# Patient Record
Sex: MALE | Race: WHITE | URBAN - METROPOLITAN AREA
[De-identification: names, ages, dates, MRNs, and addresses within clinical notes are randomized per-mention and may not be internally consistent; named-entity substitution may affect disease eponyms.]

---

## 2017-08-12 ENCOUNTER — APPOINTMENT (OUTPATIENT)
Dept: CT IMAGING | Facility: CLINIC | Age: 22
End: 2017-08-12
Attending: EMERGENCY MEDICINE

## 2017-08-12 ENCOUNTER — HOSPITAL ENCOUNTER (OUTPATIENT)
Facility: CLINIC | Age: 22
Setting detail: OBSERVATION
Discharge: HOME | End: 2017-08-12
Attending: EMERGENCY MEDICINE | Admitting: DENTIST

## 2017-08-12 ENCOUNTER — ANESTHESIA EVENT (OUTPATIENT)
Dept: SURGERY | Facility: CLINIC | Age: 22
End: 2017-08-12

## 2017-08-12 ENCOUNTER — SURGERY (OUTPATIENT)
Age: 22
End: 2017-08-12

## 2017-08-12 ENCOUNTER — HOSPITAL ENCOUNTER (EMERGENCY)
Facility: CLINIC | Age: 22
Discharge: ANOTHER HOSPITAL | End: 2017-08-12
Attending: EMERGENCY MEDICINE | Admitting: EMERGENCY MEDICINE

## 2017-08-12 ENCOUNTER — ANESTHESIA (OUTPATIENT)
Dept: SURGERY | Facility: CLINIC | Age: 22
End: 2017-08-12

## 2017-08-12 VITALS
WEIGHT: 170 LBS | OXYGEN SATURATION: 97 % | HEIGHT: 70 IN | HEART RATE: 139 BPM | TEMPERATURE: 98.1 F | BODY MASS INDEX: 24.34 KG/M2 | RESPIRATION RATE: 18 BRPM | SYSTOLIC BLOOD PRESSURE: 131 MMHG | DIASTOLIC BLOOD PRESSURE: 83 MMHG

## 2017-08-12 VITALS
RESPIRATION RATE: 16 BRPM | WEIGHT: 170 LBS | HEIGHT: 71 IN | BODY MASS INDEX: 23.8 KG/M2 | OXYGEN SATURATION: 96 % | HEART RATE: 105 BPM | TEMPERATURE: 98.1 F | SYSTOLIC BLOOD PRESSURE: 129 MMHG | DIASTOLIC BLOOD PRESSURE: 86 MMHG

## 2017-08-12 DIAGNOSIS — S02.609B: ICD-10-CM

## 2017-08-12 DIAGNOSIS — S02.609A: ICD-10-CM

## 2017-08-12 DIAGNOSIS — S02.640A: ICD-10-CM

## 2017-08-12 LAB
ANION GAP SERPL CALCULATED.3IONS-SCNC: 9 MMOL/L (ref 3–14)
BASOPHILS # BLD AUTO: 0 10E9/L (ref 0–0.2)
BASOPHILS NFR BLD AUTO: 0.2 %
BUN SERPL-MCNC: 10 MG/DL (ref 7–30)
CALCIUM SERPL-MCNC: 8.7 MG/DL (ref 8.5–10.1)
CHLORIDE SERPL-SCNC: 110 MMOL/L (ref 94–109)
CO2 SERPL-SCNC: 25 MMOL/L (ref 20–32)
CREAT SERPL-MCNC: 1.05 MG/DL (ref 0.66–1.25)
DIFFERENTIAL METHOD BLD: ABNORMAL
EOSINOPHIL # BLD AUTO: 0 10E9/L (ref 0–0.7)
EOSINOPHIL NFR BLD AUTO: 0 %
ERYTHROCYTE [DISTWIDTH] IN BLOOD BY AUTOMATED COUNT: 12.7 % (ref 10–15)
ETHANOL SERPL-MCNC: 0.22 G/DL
GFR SERPL CREATININE-BSD FRML MDRD: 88 ML/MIN/1.7M2
GLUCOSE SERPL-MCNC: 117 MG/DL (ref 70–99)
HCT VFR BLD AUTO: 48.1 % (ref 40–53)
HGB BLD-MCNC: 16 G/DL (ref 13.3–17.7)
IMM GRANULOCYTES # BLD: 0 10E9/L (ref 0–0.4)
IMM GRANULOCYTES NFR BLD: 0.3 %
LYMPHOCYTES # BLD AUTO: 1.6 10E9/L (ref 0.8–5.3)
LYMPHOCYTES NFR BLD AUTO: 14.2 %
MCH RBC QN AUTO: 28.9 PG (ref 26.5–33)
MCHC RBC AUTO-ENTMCNC: 33.3 G/DL (ref 31.5–36.5)
MCV RBC AUTO: 87 FL (ref 78–100)
MONOCYTES # BLD AUTO: 0.5 10E9/L (ref 0–1.3)
MONOCYTES NFR BLD AUTO: 4.8 %
NEUTROPHILS # BLD AUTO: 8.8 10E9/L (ref 1.6–8.3)
NEUTROPHILS NFR BLD AUTO: 80.5 %
NRBC # BLD AUTO: 0 10*3/UL
NRBC BLD AUTO-RTO: 0 /100
PLATELET # BLD AUTO: 312 10E9/L (ref 150–450)
POTASSIUM SERPL-SCNC: 4.1 MMOL/L (ref 3.4–5.3)
RBC # BLD AUTO: 5.54 10E12/L (ref 4.4–5.9)
SODIUM SERPL-SCNC: 144 MMOL/L (ref 133–144)
WBC # BLD AUTO: 10.9 10E9/L (ref 4–11)

## 2017-08-12 PROCEDURE — 96376 TX/PRO/DX INJ SAME DRUG ADON: CPT | Performed by: EMERGENCY MEDICINE

## 2017-08-12 PROCEDURE — 37000008 ZZH ANESTHESIA TECHNICAL FEE, 1ST 30 MIN: Performed by: DENTIST

## 2017-08-12 PROCEDURE — 27210794 ZZH OR GENERAL SUPPLY STERILE: Performed by: DENTIST

## 2017-08-12 PROCEDURE — 96374 THER/PROPH/DIAG INJ IV PUSH: CPT

## 2017-08-12 PROCEDURE — 25000128 H RX IP 250 OP 636: Performed by: ANESTHESIOLOGY

## 2017-08-12 PROCEDURE — 96376 TX/PRO/DX INJ SAME DRUG ADON: CPT

## 2017-08-12 PROCEDURE — 25000128 H RX IP 250 OP 636: Performed by: EMERGENCY MEDICINE

## 2017-08-12 PROCEDURE — 70450 CT HEAD/BRAIN W/O DYE: CPT

## 2017-08-12 PROCEDURE — 96365 THER/PROPH/DIAG IV INF INIT: CPT | Performed by: EMERGENCY MEDICINE

## 2017-08-12 PROCEDURE — C9399 UNCLASSIFIED DRUGS OR BIOLOG: HCPCS | Performed by: NURSE ANESTHETIST, CERTIFIED REGISTERED

## 2017-08-12 PROCEDURE — 71000027 ZZH RECOVERY PHASE 2 EACH 15 MINS: Performed by: DENTIST

## 2017-08-12 PROCEDURE — 90471 IMMUNIZATION ADMIN: CPT | Performed by: EMERGENCY MEDICINE

## 2017-08-12 PROCEDURE — 99285 EMERGENCY DEPT VISIT HI MDM: CPT | Mod: 25 | Performed by: EMERGENCY MEDICINE

## 2017-08-12 PROCEDURE — 99207 ZZC APP CREDIT; MD BILLING SHARED VISIT: CPT | Mod: Z6 | Performed by: EMERGENCY MEDICINE

## 2017-08-12 PROCEDURE — 36000064 ZZH SURGERY LEVEL 4 EA 15 ADDTL MIN - UMMC: Performed by: DENTIST

## 2017-08-12 PROCEDURE — 25000125 ZZHC RX 250: Performed by: NURSE ANESTHETIST, CERTIFIED REGISTERED

## 2017-08-12 PROCEDURE — 25000125 ZZHC RX 250: Performed by: DENTIST

## 2017-08-12 PROCEDURE — 96361 HYDRATE IV INFUSION ADD-ON: CPT

## 2017-08-12 PROCEDURE — 70486 CT MAXILLOFACIAL W/O DYE: CPT

## 2017-08-12 PROCEDURE — 96375 TX/PRO/DX INJ NEW DRUG ADDON: CPT | Performed by: EMERGENCY MEDICINE

## 2017-08-12 PROCEDURE — 25000132 ZZH RX MED GY IP 250 OP 250 PS 637: Performed by: STUDENT IN AN ORGANIZED HEALTH CARE EDUCATION/TRAINING PROGRAM

## 2017-08-12 PROCEDURE — 90715 TDAP VACCINE 7 YRS/> IM: CPT | Performed by: EMERGENCY MEDICINE

## 2017-08-12 PROCEDURE — 71000015 ZZH RECOVERY PHASE 1 LEVEL 2 EA ADDTL HR: Performed by: DENTIST

## 2017-08-12 PROCEDURE — 37000009 ZZH ANESTHESIA TECHNICAL FEE, EACH ADDTL 15 MIN: Performed by: DENTIST

## 2017-08-12 PROCEDURE — 71000014 ZZH RECOVERY PHASE 1 LEVEL 2 FIRST HR: Performed by: DENTIST

## 2017-08-12 PROCEDURE — G0378 HOSPITAL OBSERVATION PER HR: HCPCS

## 2017-08-12 PROCEDURE — 25000566 ZZH SEVOFLURANE, EA 15 MIN: Performed by: DENTIST

## 2017-08-12 PROCEDURE — 99285 EMERGENCY DEPT VISIT HI MDM: CPT | Mod: 25

## 2017-08-12 PROCEDURE — 80048 BASIC METABOLIC PNL TOTAL CA: CPT | Performed by: EMERGENCY MEDICINE

## 2017-08-12 PROCEDURE — 25000132 ZZH RX MED GY IP 250 OP 250 PS 637: Performed by: EMERGENCY MEDICINE

## 2017-08-12 PROCEDURE — 25000128 H RX IP 250 OP 636: Performed by: STUDENT IN AN ORGANIZED HEALTH CARE EDUCATION/TRAINING PROGRAM

## 2017-08-12 PROCEDURE — C1713 ANCHOR/SCREW BN/BN,TIS/BN: HCPCS | Performed by: DENTIST

## 2017-08-12 PROCEDURE — 25000128 H RX IP 250 OP 636: Performed by: NURSE ANESTHETIST, CERTIFIED REGISTERED

## 2017-08-12 PROCEDURE — 25000132 ZZH RX MED GY IP 250 OP 250 PS 637: Performed by: ANESTHESIOLOGY

## 2017-08-12 PROCEDURE — 36000062 ZZH SURGERY LEVEL 4 1ST 30 MIN - UMMC: Performed by: DENTIST

## 2017-08-12 PROCEDURE — 40000170 ZZH STATISTIC PRE-PROCEDURE ASSESSMENT II: Performed by: DENTIST

## 2017-08-12 PROCEDURE — 72125 CT NECK SPINE W/O DYE: CPT

## 2017-08-12 PROCEDURE — 80320 DRUG SCREEN QUANTALCOHOLS: CPT | Performed by: EMERGENCY MEDICINE

## 2017-08-12 PROCEDURE — 85025 COMPLETE CBC W/AUTO DIFF WBC: CPT | Performed by: EMERGENCY MEDICINE

## 2017-08-12 DEVICE — IMP SCR SYN 2.0X12MM LOCKING W/PULSEDRIVE TI 401.296: Type: IMPLANTABLE DEVICE | Site: MANDIBLE | Status: FUNCTIONAL

## 2017-08-12 DEVICE — IMPLANTABLE DEVICE: Type: IMPLANTABLE DEVICE | Site: MANDIBLE | Status: FUNCTIONAL

## 2017-08-12 DEVICE — IMP PLATE SYN LOCKING LG STR 2.0MM 06H TI 447.104: Type: IMPLANTABLE DEVICE | Site: MANDIBLE | Status: FUNCTIONAL

## 2017-08-12 RX ORDER — HYDROCODONE BITARTRATE AND ACETAMINOPHEN 5; 325 MG/1; MG/1
1-2 TABLET ORAL EVERY 4 HOURS PRN
Status: DISCONTINUED | OUTPATIENT
Start: 2017-08-12 | End: 2017-08-12 | Stop reason: HOSPADM

## 2017-08-12 RX ORDER — LIDOCAINE 40 MG/G
CREAM TOPICAL
Status: DISCONTINUED | OUTPATIENT
Start: 2017-08-12 | End: 2017-08-12 | Stop reason: HOSPADM

## 2017-08-12 RX ORDER — MORPHINE SULFATE 4 MG/ML
4 INJECTION, SOLUTION INTRAMUSCULAR; INTRAVENOUS ONCE
Status: COMPLETED | OUTPATIENT
Start: 2017-08-12 | End: 2017-08-12

## 2017-08-12 RX ORDER — AMPICILLIN AND SULBACTAM 2; 1 G/1; G/1
3 INJECTION, POWDER, FOR SOLUTION INTRAMUSCULAR; INTRAVENOUS ONCE
Status: COMPLETED | OUTPATIENT
Start: 2017-08-12 | End: 2017-08-12

## 2017-08-12 RX ORDER — BUPIVACAINE HYDROCHLORIDE AND EPINEPHRINE 5; 5 MG/ML; UG/ML
INJECTION, SOLUTION PERINEURAL PRN
Status: DISCONTINUED | OUTPATIENT
Start: 2017-08-12 | End: 2017-08-12 | Stop reason: HOSPADM

## 2017-08-12 RX ORDER — IBUPROFEN 200 MG
600 TABLET ORAL EVERY 6 HOURS PRN
Qty: 50 TABLET | Refills: 0 | Status: SHIPPED | OUTPATIENT
Start: 2017-08-12 | End: 2017-08-22

## 2017-08-12 RX ORDER — SODIUM CHLORIDE 9 MG/ML
INJECTION, SOLUTION INTRAVENOUS CONTINUOUS
Status: DISCONTINUED | OUTPATIENT
Start: 2017-08-12 | End: 2017-08-12 | Stop reason: HOSPADM

## 2017-08-12 RX ORDER — LIDOCAINE HYDROCHLORIDE 20 MG/ML
INJECTION, SOLUTION INFILTRATION; PERINEURAL PRN
Status: DISCONTINUED | OUTPATIENT
Start: 2017-08-12 | End: 2017-08-12

## 2017-08-12 RX ORDER — HYDROCODONE BITARTRATE AND ACETAMINOPHEN 5; 325 MG/1; MG/1
1 TABLET ORAL
Status: COMPLETED | OUTPATIENT
Start: 2017-08-12 | End: 2017-08-12

## 2017-08-12 RX ORDER — ONDANSETRON 2 MG/ML
4 INJECTION INTRAMUSCULAR; INTRAVENOUS EVERY 30 MIN PRN
Status: DISCONTINUED | OUTPATIENT
Start: 2017-08-12 | End: 2017-08-12 | Stop reason: HOSPADM

## 2017-08-12 RX ORDER — AMOXICILLIN 500 MG/1
500 CAPSULE ORAL 3 TIMES DAILY
Qty: 21 CAPSULE | Refills: 0 | Status: SHIPPED | OUTPATIENT
Start: 2017-08-12 | End: 2017-08-19

## 2017-08-12 RX ORDER — HYDROMORPHONE HYDROCHLORIDE 1 MG/ML
.3-.5 INJECTION, SOLUTION INTRAMUSCULAR; INTRAVENOUS; SUBCUTANEOUS EVERY 5 MIN PRN
Status: DISCONTINUED | OUTPATIENT
Start: 2017-08-12 | End: 2017-08-12 | Stop reason: HOSPADM

## 2017-08-12 RX ORDER — MORPHINE SULFATE 2 MG/ML
2 INJECTION, SOLUTION INTRAMUSCULAR; INTRAVENOUS EVERY 4 HOURS PRN
Status: DISCONTINUED | OUTPATIENT
Start: 2017-08-12 | End: 2017-08-12 | Stop reason: HOSPADM

## 2017-08-12 RX ORDER — FENTANYL CITRATE 50 UG/ML
25-50 INJECTION, SOLUTION INTRAMUSCULAR; INTRAVENOUS
Status: DISCONTINUED | OUTPATIENT
Start: 2017-08-12 | End: 2017-08-12 | Stop reason: HOSPADM

## 2017-08-12 RX ORDER — HYDROCODONE BITARTRATE AND ACETAMINOPHEN 5; 325 MG/1; MG/1
1-2 TABLET ORAL EVERY 4 HOURS PRN
Qty: 20 TABLET | Refills: 0 | Status: SHIPPED | OUTPATIENT
Start: 2017-08-12 | End: 2017-08-19

## 2017-08-12 RX ORDER — ONDANSETRON 2 MG/ML
INJECTION INTRAMUSCULAR; INTRAVENOUS PRN
Status: DISCONTINUED | OUTPATIENT
Start: 2017-08-12 | End: 2017-08-12

## 2017-08-12 RX ORDER — ONDANSETRON 4 MG/1
4 TABLET, ORALLY DISINTEGRATING ORAL EVERY 6 HOURS PRN
Status: DISCONTINUED | OUTPATIENT
Start: 2017-08-12 | End: 2017-08-12 | Stop reason: HOSPADM

## 2017-08-12 RX ORDER — NALOXONE HYDROCHLORIDE 0.4 MG/ML
.1-.4 INJECTION, SOLUTION INTRAMUSCULAR; INTRAVENOUS; SUBCUTANEOUS
Status: DISCONTINUED | OUTPATIENT
Start: 2017-08-12 | End: 2017-08-12 | Stop reason: HOSPADM

## 2017-08-12 RX ORDER — ACETAMINOPHEN 325 MG/1
650 TABLET ORAL EVERY 4 HOURS PRN
Qty: 50 TABLET | Refills: 0 | Status: SHIPPED | OUTPATIENT
Start: 2017-08-12 | End: 2017-08-22

## 2017-08-12 RX ORDER — CHLORHEXIDINE GLUCONATE ORAL RINSE 1.2 MG/ML
SOLUTION DENTAL
Qty: 473 ML | Refills: 0 | Status: SHIPPED | OUTPATIENT
Start: 2017-08-12

## 2017-08-12 RX ORDER — FENTANYL CITRATE 50 UG/ML
INJECTION, SOLUTION INTRAMUSCULAR; INTRAVENOUS PRN
Status: DISCONTINUED | OUTPATIENT
Start: 2017-08-12 | End: 2017-08-12

## 2017-08-12 RX ORDER — IBUPROFEN 600 MG/1
600 TABLET, FILM COATED ORAL EVERY 6 HOURS PRN
Status: DISCONTINUED | OUTPATIENT
Start: 2017-08-12 | End: 2017-08-12 | Stop reason: HOSPADM

## 2017-08-12 RX ORDER — PROPOFOL 10 MG/ML
INJECTION, EMULSION INTRAVENOUS PRN
Status: DISCONTINUED | OUTPATIENT
Start: 2017-08-12 | End: 2017-08-12

## 2017-08-12 RX ORDER — ONDANSETRON 2 MG/ML
4 INJECTION INTRAMUSCULAR; INTRAVENOUS EVERY 6 HOURS PRN
Status: DISCONTINUED | OUTPATIENT
Start: 2017-08-12 | End: 2017-08-12 | Stop reason: HOSPADM

## 2017-08-12 RX ORDER — AMPICILLIN AND SULBACTAM 1; .5 G/1; G/1
INJECTION, POWDER, FOR SOLUTION INTRAMUSCULAR; INTRAVENOUS PRN
Status: DISCONTINUED | OUTPATIENT
Start: 2017-08-12 | End: 2017-08-12

## 2017-08-12 RX ORDER — ONDANSETRON 4 MG/1
4 TABLET, ORALLY DISINTEGRATING ORAL EVERY 30 MIN PRN
Status: DISCONTINUED | OUTPATIENT
Start: 2017-08-12 | End: 2017-08-12 | Stop reason: HOSPADM

## 2017-08-12 RX ORDER — SODIUM CHLORIDE, SODIUM LACTATE, POTASSIUM CHLORIDE, CALCIUM CHLORIDE 600; 310; 30; 20 MG/100ML; MG/100ML; MG/100ML; MG/100ML
INJECTION, SOLUTION INTRAVENOUS CONTINUOUS
Status: DISCONTINUED | OUTPATIENT
Start: 2017-08-12 | End: 2017-08-12 | Stop reason: HOSPADM

## 2017-08-12 RX ORDER — KETOROLAC TROMETHAMINE 30 MG/ML
INJECTION, SOLUTION INTRAMUSCULAR; INTRAVENOUS PRN
Status: DISCONTINUED | OUTPATIENT
Start: 2017-08-12 | End: 2017-08-12

## 2017-08-12 RX ORDER — DEXAMETHASONE SODIUM PHOSPHATE 4 MG/ML
INJECTION, SOLUTION INTRA-ARTICULAR; INTRALESIONAL; INTRAMUSCULAR; INTRAVENOUS; SOFT TISSUE PRN
Status: DISCONTINUED | OUTPATIENT
Start: 2017-08-12 | End: 2017-08-12

## 2017-08-12 RX ORDER — NICOTINE 21 MG/24HR
1 PATCH, TRANSDERMAL 24 HOURS TRANSDERMAL DAILY
Status: DISCONTINUED | OUTPATIENT
Start: 2017-08-12 | End: 2017-08-12 | Stop reason: HOSPADM

## 2017-08-12 RX ADMIN — KETOROLAC TROMETHAMINE 30 MG: 30 INJECTION, SOLUTION INTRAMUSCULAR at 18:18

## 2017-08-12 RX ADMIN — SODIUM CHLORIDE 1000 ML: 9 INJECTION, SOLUTION INTRAVENOUS at 13:20

## 2017-08-12 RX ADMIN — MORPHINE SULFATE 4 MG: 4 INJECTION, SOLUTION INTRAMUSCULAR; INTRAVENOUS at 13:20

## 2017-08-12 RX ADMIN — SODIUM CHLORIDE: 9 INJECTION, SOLUTION INTRAVENOUS at 16:15

## 2017-08-12 RX ADMIN — SODIUM CHLORIDE: 9 INJECTION, SOLUTION INTRAVENOUS at 17:53

## 2017-08-12 RX ADMIN — DEXAMETHASONE SODIUM PHOSPHATE 8 MG: 4 INJECTION, SOLUTION INTRA-ARTICULAR; INTRALESIONAL; INTRAMUSCULAR; INTRAVENOUS; SOFT TISSUE at 16:30

## 2017-08-12 RX ADMIN — MORPHINE SULFATE 4 MG: 4 INJECTION, SOLUTION INTRAMUSCULAR; INTRAVENOUS at 09:31

## 2017-08-12 RX ADMIN — HYDROCODONE BITARTRATE AND ACETAMINOPHEN 1 TABLET: 5; 325 TABLET ORAL at 20:35

## 2017-08-12 RX ADMIN — NICOTINE 1 PATCH: 14 PATCH, EXTENDED RELEASE TRANSDERMAL at 13:56

## 2017-08-12 RX ADMIN — FENTANYL CITRATE 25 MCG: 50 INJECTION, SOLUTION INTRAMUSCULAR; INTRAVENOUS at 19:33

## 2017-08-12 RX ADMIN — FENTANYL CITRATE 50 MCG: 50 INJECTION, SOLUTION INTRAMUSCULAR; INTRAVENOUS at 18:28

## 2017-08-12 RX ADMIN — SUGAMMADEX 160 MG: 100 INJECTION, SOLUTION INTRAVENOUS at 18:37

## 2017-08-12 RX ADMIN — ONDANSETRON 4 MG: 2 INJECTION INTRAMUSCULAR; INTRAVENOUS at 18:21

## 2017-08-12 RX ADMIN — MORPHINE SULFATE 4 MG: 4 INJECTION, SOLUTION INTRAMUSCULAR; INTRAVENOUS at 05:58

## 2017-08-12 RX ADMIN — FENTANYL CITRATE 100 MCG: 50 INJECTION, SOLUTION INTRAMUSCULAR; INTRAVENOUS at 16:15

## 2017-08-12 RX ADMIN — ROCURONIUM BROMIDE 50 MG: 10 INJECTION INTRAVENOUS at 16:25

## 2017-08-12 RX ADMIN — MORPHINE SULFATE 4 MG: 4 INJECTION, SOLUTION INTRAMUSCULAR; INTRAVENOUS at 05:23

## 2017-08-12 RX ADMIN — MORPHINE SULFATE 4 MG: 4 INJECTION, SOLUTION INTRAMUSCULAR; INTRAVENOUS at 10:46

## 2017-08-12 RX ADMIN — AMPICILLIN SODIUM AND SULBACTAM SODIUM 3 G: 1; .5 INJECTION, POWDER, FOR SOLUTION INTRAMUSCULAR; INTRAVENOUS at 16:35

## 2017-08-12 RX ADMIN — HYDROCODONE BITARTRATE AND ACETAMINOPHEN 2 TABLET: 5; 325 TABLET ORAL at 13:59

## 2017-08-12 RX ADMIN — BUPIVACAINE HYDROCHLORIDE AND EPINEPHRINE BITARTRATE 10 ML: 5; .005 INJECTION, SOLUTION EPIDURAL; INTRACAUDAL; PERINEURAL at 17:04

## 2017-08-12 RX ADMIN — CLOSTRIDIUM TETANI TOXOID ANTIGEN (FORMALDEHYDE INACTIVATED), CORYNEBACTERIUM DIPHTHERIAE TOXOID ANTIGEN (FORMALDEHYDE INACTIVATED), BORDETELLA PERTUSSIS TOXOID ANTIGEN (GLUTARALDEHYDE INACTIVATED), BORDETELLA PERTUSSIS FILAMENTOUS HEMAGGLUTININ ANTIGEN (FORMALDEHYDE INACTIVATED), BORDETELLA PERTUSSIS PERTACTIN ANTIGEN, AND BORDETELLA PERTUSSIS FIMBRIAE 2/3 ANTIGEN 0.5 ML: 5; 2; 2.5; 5; 3; 5 INJECTION, SUSPENSION INTRAMUSCULAR at 09:32

## 2017-08-12 RX ADMIN — MORPHINE SULFATE 4 MG: 4 INJECTION, SOLUTION INTRAMUSCULAR; INTRAVENOUS at 08:29

## 2017-08-12 RX ADMIN — FENTANYL CITRATE 50 MCG: 50 INJECTION, SOLUTION INTRAMUSCULAR; INTRAVENOUS at 16:25

## 2017-08-12 RX ADMIN — PROPOFOL 200 MG: 10 INJECTION, EMULSION INTRAVENOUS at 16:25

## 2017-08-12 RX ADMIN — SODIUM CHLORIDE: 9 INJECTION, SOLUTION INTRAVENOUS at 17:15

## 2017-08-12 RX ADMIN — MORPHINE SULFATE 4 MG: 4 INJECTION, SOLUTION INTRAMUSCULAR; INTRAVENOUS at 06:40

## 2017-08-12 RX ADMIN — MORPHINE SULFATE 4 MG: 4 INJECTION, SOLUTION INTRAMUSCULAR; INTRAVENOUS at 04:44

## 2017-08-12 RX ADMIN — FENTANYL CITRATE 25 MCG: 50 INJECTION, SOLUTION INTRAMUSCULAR; INTRAVENOUS at 20:00

## 2017-08-12 RX ADMIN — MIDAZOLAM HYDROCHLORIDE 2 MG: 1 INJECTION, SOLUTION INTRAMUSCULAR; INTRAVENOUS at 16:15

## 2017-08-12 RX ADMIN — SODIUM CHLORIDE 1000 ML: 9 INJECTION, SOLUTION INTRAVENOUS at 05:23

## 2017-08-12 RX ADMIN — AMPICILLIN SODIUM AND SULBACTAM SODIUM 3 G: 2; 1 INJECTION, POWDER, FOR SOLUTION INTRAMUSCULAR; INTRAVENOUS at 09:34

## 2017-08-12 RX ADMIN — LIDOCAINE HYDROCHLORIDE 100 MG: 20 INJECTION, SOLUTION INFILTRATION; PERINEURAL at 16:25

## 2017-08-12 ASSESSMENT — PAIN DESCRIPTION - DESCRIPTORS
DESCRIPTORS: SORE

## 2017-08-12 ASSESSMENT — ENCOUNTER SYMPTOMS
DYSRHYTHMIAS: 1
FEVER: 0
VOMITING: 0
SHORTNESS OF BREATH: 0
NECK PAIN: 0
WOUND: 1
NAUSEA: 0

## 2017-08-12 NOTE — ED NOTES
Physician in to discuss transfer with pt. Pt verbalizes understanding of plan to transfer by private vehicle to [facility] ER

## 2017-08-12 NOTE — ED NOTES
Pt and significant other verbalize understanding of need to go directly to [facility] ER, NPO status discussed and pt verbalizes understanding. Directions provided to Pt's significant other

## 2017-08-12 NOTE — CONSULTS
[facility], [place]     Consult note: Trauma Service     Date of Admission:  8/12/2017    Time of Admission/Consult Request (page/call): 8/12/17  Time of my evaluation: 1pm     Assessment & Plan   Trauma mechanism: fall   Time/date of injury: 8/12/17  Known Injuries:  1. Fracture of the left mandibular mentum and bilateral mandibular rami.    Other diagnoses:   1. Acute pain   2. Pain  3. BAL 0.22 ( at time of presentation to OSH)     Procedure: pending     Plan:  1. Trauma consult--no additional sources of pain.no additional injuries noted.   2. Tertiary examination to follow in am (if pt still inpatient). Trauma to follow along. Call job code [id number] if we can be of assistance.   3. Per Discussion with ED staff--OMFS primary and will admit.  Spoke with resident on call, who confirms this.   4. OR pending OMFS, current time pending for 4pm per discussion with OMFS resident on call.   5. Pain control per discretion of OMFS team.   6. Ice and HOB elevation.   General Cares:  GI Prophylaxis:  None   DVT Prophylaxis:  Hold pending OR     ETOH: This patient was asked if in the last 3-6 months there has been a time when he had  5 or more drinks in a single day/outing.. Patient answer to the screening question was negative. He reports he only drinks 4 drinks.  He report he and his brother got into agiht with alcohol. I did speak with the patient about the correlation of ETOH use and accidents/injuries. We also discussed the importance of abstaining from ETOH use while healing from existing injuries, especially if prescribed narcotics at the time of discharge. The patient demonstrated understanding.  Primary Care Physician   *Physician No Ref-Primary    Chief Complaint   Fall with mandible fracture     History is obtained from the patient    History of Present Illness   Freddy Lange is a 22 year old male who presents with bilateral subcondylar and left parasymphysis fracture. 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 He reports while walking down 3 steps, he accidentally misstep and hit his heel causing him to fall forward onto his jaw. He reports landing on he jaw. He denies any other injuries or losing consciousness.      He currently reports pain in in his face, on his left jaw more than his right. He denies chest pain, shortness of breath, fever or chills or other sources of pain.     Past Medical History    He denies any significant medical history, he does report what appears to be PVC during high school wrestling physical when he was 16 but has not had any issues since that time.     Past Surgical History   He denies any surgical history.     Prior to Admission Medications   Prior to Admission Medications   Prescriptions Last Dose Informant Patient Reported? Taking?   NO ACTIVE MEDICATIONS   Yes No   ibuprofen (ADVIL,MOTRIN) 800 MG tablet Unknown at Unknown time  No No   Sig: Take 1 tablet (800 mg) by mouth every 8 hours as needed for moderate pain      Facility-Administered Medications: None     Allergies   No Known Allergies    Social History   Social History     Social History     Marital status: Single     Spouse name: N/A     Number of children: N/A     Years of education: N/A     Social History Narrative     He is single but does have a girlfriend. He chews tobacco daily. He denies smoking tobacco. He reports social use of alcohol. He denies any IV, street or recreational drug use.     Family History   I have reviewed this patient's family history and updated it with pertinent information if needed.   Family History   Problem Relation Age of Onset     DIABETES Father  now       DIABETES Mother      Unknown/Adopted Maternal Grandfather      Cancer - colorectal Paternal Grandmother      Unknown/Adopted Paternal Grandfather      Cardiovascular--coarctation of Aorta at 14 yos Brother        Review of Systems   CONSTITUTIONAL: No fever, chills, sweats,  fatigue   EYES: no visual blurring, no double vision or visual loss  ENT: (+) Jaw pain and swelling   RESPIRATORY: no shortness of breath, no cough, no sputum   CARDIOVASCULAR: no palpitations, no chest  pain, no exertional chest pain or pressure  GASTROINTESTINAL: no nausea or vomiting, or abd pain  GENITOURINARY: no dysuria, no frequency or hesitancy, no hematuria  MUSCULOSKELETAL: no weakness, no redness, no swelling, no joint pain,   SKIN: no rashes, ecchymoses, abrasions or lacerations  NEUROLOGIC: no numbness or tingling of hands, no numbness or tingling  of feet, no syncope, no tremors or weakness  PSYCHIATRIC: no sleep disturbances, no anxiety or depression    Physical Exam   Temp: 98.7  F (37.1  C) Temp src: Oral BP: 126/77 Pulse: 118   Resp: 15 SpO2: 99 % O2 Device: None (Room air)    Vital Signs with Ranges  Temp:  [98.1  F (36.7  C)-98.7  F (37.1  C)] 98.7  F (37.1  C)  Pulse:  [118-139] 118  Resp:  [] 15  BP: (106-144)/() 126/77  SpO2:  [96 %-100 %] 99 % 170 lbs 0 oz    Primary Survey:  Airway: patient talking  Breathing: symmetric respiratory effort bilaterally  Circulation: central pulses present and peripheral pulses present  Disability: Pupils - left 4 mm and brisk, right 4 mm and brisk     [place] Coma Scale - Total 15/15    Secondary Survey:  General: alert, oriented to person, place, time  Head: No scalp tenderness or swelling. Midface without TTP,  Jaw pain L>R. Left facial swelling more prominent.   Eyes: PERRLA, pupils 4mm, EOMI, corneas and conjunctivae clear  Ears: TM's occluded by cerumen, and non-inflamed external ear canals, no pain in auricle or pinnae  Nose: nares patent, no drainage, nasal septum non-tender  Mouth/Throat: OP with some dried blood. Front tooth chipped (Left)--chronic per patient tongue midline, (+) malocclusion.   Neck:  No cervical collar present. No midline posterior tenderness, full AROM without pain or tenderness   Chest/Pulmonary: normal respiratory  rate and rhythm,  bilateral clear breath sounds, no wheezes, rales or rhonchi, no chest wall tenderness or deformities,   Cardiovascular: S1, S2,  normal and regular rate and rhythm, no murmurs  Abdomen: soft, non-tender, no guarding, no rebound tenderness and no tenderness to palpation  : no berman, no urine assess/urine yellow and clear  Back/Spine: no deformity, no midline tenderness, no sacral tenderness,  no step-offs and no abrasions or contusions  Musculoskel/Extremities: normal extremities, full AROM of major joints without tenderness, edema, erythema, ecchymosis, or abrasions. 2+PP, no edema.  No posterior spinal tenderness   Hand: no gross deformities of hands or fingers. Full AROM of hand and fingers in flexion and extension.  strength equal and symmetric.   Skin: no rashes, laceration, ecchymosis, skin warm and dry.   Neuro: PERRLA, alert, oriented x 4. CN II-XII grossly intact. No focal deficits. Strength 5/5 x 4 extremities.  Sensation intact.  Psychiatric: affect/mood normal, cooperative, normal judgement/insight and memory intact    Data   basic metabolic panel  Last Basic Metabolic Panel:  Lab Results   Component Value Date     08/12/2017      Lab Results   Component Value Date    POTASSIUM 4.1 08/12/2017     Lab Results   Component Value Date    CHLORIDE 110 08/12/2017     Lab Results   Component Value Date    NELLY 8.7 08/12/2017     Lab Results   Component Value Date    CO2 25 08/12/2017     Lab Results   Component Value Date    BUN 10 08/12/2017     Lab Results   Component Value Date    CR 1.05 08/12/2017     Lab Results   Component Value Date     08/12/2017       CBC RESULTS:   Recent Labs   Lab Test  08/12/17   0445   WBC  10.9   RBC  5.54   HGB  16.0   HCT  48.1   MCV  87   MCH  28.9   MCHC  33.3   RDW  12.7   PLT  312      Blood alcohol level: 0.22    Deep Mcleod PA-C

## 2017-08-12 NOTE — ED NOTES
Pt to ER with c/o facial trauma , left lower jaw deformity, pt face planted on concrete, pt denies any neck or head pain, no LOC, pt does have a chipped front tooth but that is old, pt has obvious displacement of lower mandible

## 2017-08-12 NOTE — IP AVS SNAPSHOT
MRN:[id number]                      After Visit Summary   8/12/2017    Freddy Lange    MRN: [id number]           Thank you!     Thank you for choosing [facility] for your care. Our goal is always to provide you with excellent care. Hearing back from our patients is one way we can continue to improve our services. Please take a few minutes to complete the written survey that you may receive in the mail after you visit with us. Thank you!        Patient Information     Date Of Birth          1995        About your hospital stay     You were admitted on:  August 12, 2017 You last received care in the:  Post Anesthesia Care Unit [facility]    You were discharged on:  August 12, 2017       Who to Call     For medical emergencies, please call 911.  For non-urgent questions about your medical care, please call your primary care provider or clinic, None  For questions related to your surgery, please call your surgery clinic        Attending Provider     Provider Specialty    Carmita Gracia MD Emergency Medicine    Juventino Gonzalez DDS Oral Surgery       Primary Care Provider    Physician No Ref-Primary      After Care Instructions     Discharge Instructions       - No lifting greater than 15 lbs, no driving while using narcotic pain medication, no strenuous exercise for 2 weeks.   - Patient to rest quietly day after surgery  - Patient can resume light active duty on post-operative day #2.  - Patient to maintain good oral hygiene with brushing of teeth 2x/day with soft toothbrush starting day after surgery.  - Patient to maintain full liquid to soft diet. Examples: pudding, jello, yogurt, ice cream, milkshakes, Boost, Ensure, mashed potatoes, soup, apple sauce, etc. until further specified by OMS at follow-up.  - Please avoid smoking, spitting, drinking through straws or vigoroursly rinsing your mouth.   - After 24 hours, begin gentle salt water rinses, several times a day, especially after  eating.   - Some bleeding is normal from surgical areas. We recommend firm pressure, usually by biting on gauze. If bleeding occurs place a moist gauze on the surgical site until active bleeding stops. It is important that the gauze is in proper position and pressure is kept on the area for 20-30 minutes to control bleeding.   - Some swelling and bruising may occur following surgery. This will usually peak in 36-48 hours. We recommend ice pack to area on outside of face. It should stay on 10 minutes then off for a short while so the skin doesn't get cold.   - While sleeping or resting, elevate your head on 2 pillows, it will help with swelling.   - Please call [phone] to ask for oral surgery resident on call if questions or concerns.   - Follow-up appt: [facility] 7th Floor, Tracey Zeeland ([address] [facility]) - on Thursday 8/17/17 - please call to schedule time [phone] / [phone].                  Further instructions from your care team       [facility]  Same-Day Surgery   Adult Discharge Orders & Instructions     For 24 hours after surgery    1. Get plenty of rest.  A responsible adult must stay with you for at least 24 hours after you leave the hospital.   2. Do not drive or use heavy equipment.  If you have weakness or tingling, don't drive or use heavy equipment until this feeling goes away.  3. Do not drink alcohol.  4. Avoid strenuous or risky activities.  Ask for help when climbing stairs.   5. You may feel lightheaded.  IF so, sit for a few minutes before standing.  Have someone help you get up.   6. If you have nausea (feel sick to your stomach): Drink only clear liquids such as apple juice, ginger ale, broth or 7-Up.  Rest may also help.  Be sure to drink enough fluids.  Move to a regular diet as you feel able.  7. You may have a slight fever. Call the doctor if your fever is over 100 F (37.7 C) (taken under the tongue) or lasts longer than 24  "hours.  8. You may have a dry mouth, a sore throat, muscle aches or trouble sleeping.  These should go away after 24 hours.  9. Do not make important or legal decisions.   Call your doctor for any of the followin.  Signs of infection (fever, growing tenderness at the surgery site, a large amount of drainage or bleeding, severe pain, foul-smelling drainage, redness, swelling).    2. It has been over 8 to 10 hours since surgery and you are still not able to urinate (pass water).    3.  Headache for over 24 hours.      To contact a doctor, call Dr. Gonzalez office @ [phone]   or:  X   [phone] and ask for the resident on call for Oral Surgery  (answered 24 hours a day)  X   Emergency Department:  [facility] [place] [facility]: [phone]       (TTY for hearing impaired: [phone])            Pending Results     No orders found from 8/10/2017 to 2017.            Admission Information     Date & Time Provider Department Dept. Phone    2017 Juventino Gonzalez DDS Post Anesthesia Care Unit [facility] [phone]      Your Vitals Were     Blood Pressure Pulse Temperature Respirations Height Weight    142/81 105 98.2  F (36.8  C) (Axillary) 14 1.803 m (5' 11\") 77.1 kg (170 lb)    Pulse Oximetry BMI (Body Mass Index)                100% 23.71 kg/m2          [employer] Information     [employer] lets you send messages to your doctor, view your test results, renew your prescriptions, schedule appointments and more. To sign up, go to [email or web address] . Click on \"Log in\" on the left side of the screen, which will take you to the Welcome page. Then click on \"Sign up Now\" on the right side of the page.     You will be asked to enter the access code listed below, as well as some personal information. Please follow the directions to create your username and password.     Your access code is: [email or web address]  Expires: 11/10/2017  7:05 PM     Your access code will  in 90 days. If you need help or a new code, " please call your [place] clinic or [phone].        Care EveryWhere ID     This is your Care EveryWhere ID. This could be used by other organizations to access your [place] medical records  [id number]        Equal Access to Services     GALI MANNING : Hadii aad ku hadchanoshaniqua Guanaco, waaxda luqadaha, qaybta kaalmada aderolyyada, rita branhamscotty cam michaelkilo olmedo. So [facility] [phone].    ATENCIÓN: Si habla español, tiene a deluca disposición servicios gratuitos de asistencia lingüística. Llame al [phone].    We comply with applicable federal civil rights laws and [place] laws. We do not discriminate on the basis of race, color, national origin, age, disability sex, sexual orientation or gender identity.               Review of your medicines      START taking        Dose / Directions    acetaminophen 325 MG tablet   Commonly known as:  TYLENOL   Used for:  Bilateral fracture of mandible, open, initial encounter (H)        Dose:  650 mg   Take 2 tablets (650 mg) by mouth every 4 hours as needed for pain or fever (mild pain or fever)   Quantity:  50 tablet   Refills:  0       amoxicillin 500 MG capsule   Commonly known as:  AMOXIL   Used for:  Bilateral fracture of mandible, open, initial encounter (H)        Dose:  500 mg   Take 1 capsule (500 mg) by mouth 3 times daily for 7 days   Quantity:  21 capsule   Refills:  0       chlorhexidine 0.12 % solution   Commonly known as:  PERIDEX   Used for:  Bilateral fracture of mandible, open, initial encounter (H)        Swish 10 mL in mouth for 30 seconds and spit two times daily   Quantity:  473 mL   Refills:  0       HYDROcodone-acetaminophen 5-325 MG per tablet   Commonly known as:  NORCO   Used for:  Bilateral fracture of mandible, open, initial encounter (H)        Dose:  1-2 tablet   Take 1-2 tablets by mouth every 4 hours as needed for pain (moderate to severe pain)   Quantity:  20 tablet   Refills:  0       nicotine 7 MG/24HR 24 hr patch   Commonly  known as:  NICODERM CQ   Used for:  Bilateral fracture of mandible, open, initial encounter (H)        Dose:  1 patch   Place 1 patch onto the skin every 24 hours   Quantity:  30 patch   Refills:  0         CONTINUE these medicines which may have CHANGED, or have new prescriptions. If we are uncertain of the size of tablets/capsules you have at home, strength may be listed as something that might have changed.        Dose / Directions    ibuprofen 200 MG tablet   Commonly known as:  ADVIL/MOTRIN   This may have changed:    - medication strength  - how much to take  - reasons to take this   Used for:  Bilateral fracture of mandible, open, initial encounter (H)        Dose:  600 mg   Take 3 tablets (600 mg) by mouth every 6 hours as needed for pain or fever (mild to moderate pain, or temperature greater than 102 F)   Quantity:  50 tablet   Refills:  0            Where to get your medicines      These medications were sent to [facility] [address] [place] [address] [address]  [address] [facility] [address]     Phone:  [phone]     acetaminophen 325 MG tablet    amoxicillin 500 MG capsule    chlorhexidine 0.12 % solution    ibuprofen 200 MG tablet         Some of these will need a paper prescription and others can be bought over the counter. Ask your nurse if you have questions.     Bring a paper prescription for each of these medications     HYDROcodone-acetaminophen 5-325 MG per tablet    nicotine 7 MG/24HR 24 hr patch                Protect others around you: Learn how to safely use, store and throw away your medicines at www.disposemymeds.org.             Medication List: This is a list of all your medications and when to take them. Check marks below indicate your daily home schedule. Keep this list as a reference.      Medications           Morning Afternoon Evening Bedtime As Needed    acetaminophen 325 MG tablet   Commonly known as:  TYLENOL    Take 2 tablets (650 mg) by mouth every 4 hours as needed for pain or fever (mild pain or fever)                                amoxicillin 500 MG capsule   Commonly known as:  AMOXIL   Take 1 capsule (500 mg) by mouth 3 times daily for 7 days                                chlorhexidine 0.12 % solution   Commonly known as:  PERIDEX   Swish 10 mL in mouth for 30 seconds and spit two times daily                                HYDROcodone-acetaminophen 5-325 MG per tablet   Commonly known as:  NORCO   Take 1-2 tablets by mouth every 4 hours as needed for pain (moderate to severe pain)   Last time this was given:  2 tablets on 8/12/2017  1:59 PM                                ibuprofen 200 MG tablet   Commonly known as:  ADVIL/MOTRIN   Take 3 tablets (600 mg) by mouth every 6 hours as needed for pain or fever (mild to moderate pain, or temperature greater than 102 F)                                nicotine 7 MG/24HR 24 hr patch   Commonly known as:  NICODERM CQ   Place 1 patch onto the skin every 24 hours

## 2017-08-12 NOTE — ANESTHESIA PREPROCEDURE EVALUATION
Anesthesia Evaluation     . Pt has not had prior anesthetic            ROS/MED HX    ENT/Pulmonary: Comment:   s\p fall with facial fractures including bilateral subcondylar, and L parasymphisis          Neurologic:  - neg neurologic ROS     Cardiovascular:     (+) ----. : . . . :. dysrhythmias PVCs, . Previous cardiac testing Echodate:2009results:  ECHO to evaluate for PVC's:  Color Flow CONCLUSION:  Patient with premature ventricular   contractions.  Normal ventricular anatomy.  Normal ventricular size   and contractility.  There is no evidence of shunts.      date: results: date: results: date: results:          METS/Exercise Tolerance:  >4 METS   Hematologic:  - neg hematologic  ROS       Musculoskeletal:  - neg musculoskeletal ROS       GI/Hepatic:  - neg GI/hepatic ROS       Renal/Genitourinary:  - ROS Renal section negative       Endo:  - neg endo ROS       Psychiatric:  - neg psychiatric ROS       Infectious Disease:  - neg infectious disease ROS       Malignancy:      - no malignancy   Other:                 Cervical spine CT w/o contrast  1. No cervical spine fracture or malalignment.  2. Bilateral mandibular fracture.  Report per radiology      Maxillofacial CT w/o contrast  IMPRESSION:  1. Fracture of the left mandibular mentum and bilateral mandibular  rami.  2. Nasal bone deformity is age indeterminate.  Report per radiology      CT Head w/o Contrast  IMPRESSION: No acute abnormality.  Report per radiology                    Anesthesia Plan      History & Physical Review  History and physical reviewed and following examination; no interval change.    ASA Status:  1 emergent.    NPO Status:  Waived due to emergency    Plan for ETT and General (Nasal FOZIA) with Intravenous and Propofol induction. Maintenance will be Balanced.    PONV prophylaxis:  Ondansetron (or other 5HT-3) and Dexamethasone or Solumedrol       Postoperative Care  Postoperative pain management:  IV analgesics, Oral pain medications and  Multi-modal analgesia.      Consents  Anesthetic plan, risks, benefits and alternatives discussed with:  Patient.  Use of blood products discussed: No .   .        ________________________________________________________________    Assessment: Freddy Lange is a 22 year old male with history of fall with left mandibular fractrue who is scheduled for Procedure(s):  Open Reduction Internal Fixation Bilateral Mandible Fracture - Wound Class: I-Clean with Dr. Gonzalez. Patient has no significant medical history, no cardiac or pulmonary issues of note. He did have evaluation for frequent PVC's ~8 years ago to evaluate without any abnormalities.    Plan: To be discussed with staff.   - Nasal FOZIA for intubation with standard ASA monitors, IV induction, balanced anesthetic  - Monitoring and Access: PIV  - Labs: as below  - Previous Anesthesia: None  - Antibiotics per surgery  - PONV prophylaxis    Maxi Cannon, CA-2  [phone]    Procedure: Procedure(s):  Open Reduction Internal Fixation Bilateral Mandible Fracture - Wound Class: I-Clean    PMHx/PSHx:  History reviewed. No pertinent past medical history.  Past Surgical History:   Procedure Laterality Date     NO HISTORY OF SURGERY       Social History   Substance Use Topics     Smoking status: Passive Smoke Exposure - Never Smoker     Smokeless tobacco: Never Used      Comment: mom smoke in the house     Alcohol use No     No Known Allergies  Prescriptions Prior to Admission   Medication Sig Dispense Refill Last Dose     IBUPROFEN PO Take 200-400 mg by mouth every 6 hours as needed for moderate pain   Past Month at Unknown time     No current outpatient prescriptions on file.     Objective:   Lab Results   Component Value Date    WBC 10.9 08/12/2017    HGB 16.0 08/12/2017    HCT 48.1 08/12/2017     08/12/2017     08/12/2017    POTASSIUM 4.1 08/12/2017    CHLORIDE 110 (H) 08/12/2017    CO2 25 08/12/2017    BUN 10 08/12/2017    CR 1.05 08/12/2017     (H)  08/12/2017    TROPI <0.012 03/03/2009    AST 32 03/03/2009    ALT 13 03/03/2009    ALKPHOS 356 03/03/2009    BILITOTAL 0.3 03/03/2009

## 2017-08-12 NOTE — PHARMACY-ADMISSION MEDICATION HISTORY
Admission medication history interview status for the 8/12/2017 admission is complete. See Epic admission navigator for allergy information, pharmacy, prior to admission medications and immunization status.     Medication history interview sources:    -Patient was an excellent historian.     Changes made to PTA medication list (reason)  Added:   -N/A  Deleted:   -N/A  Changed:   -Ibuprofen directions updated per patient.   --Previous directions: Take 800 mg by mouth every 8 hours as needed for moderate pain.   --Updated directions: Take 200-400 mg by mouth every 6 hours as needed for moderate pain.     Additional medication history information (including reliability of information, actions taken by pharmacist):  -Patient verified no known drug allergies.       Prior to Admission medications    Medication Sig Last Dose Taking? Auth Provider   IBUPROFEN PO Take 200-400 mg by mouth every 6 hours as needed for moderate pain Past Month at Unknown time Yes Unknown, Entered By History         Medication history completed by: Nanette Ku, Pharmacy Intern

## 2017-08-12 NOTE — ED NOTES
Bed: IN01  Expected date:   Expected time:   Means of arrival:   Comments:  Freddy Lange- mandible fracture from [place].  Oral surgery to see.

## 2017-08-12 NOTE — OP NOTE
DATE OF SERVICE:  08/12/17      STAFF SURGEON:  Dr Gonzalez      RESIDENT SURGEON:  Rodriguez Rosario DDS      PREOPERATIVE DIAGNOSES:    Left displaced parasymphysis fracture  Bilateral subcondylar fractures     POST-OPERATIVE DIAGNOSIS:    Left displaced parasymphysis fracture  Bilateral subcondylar fractures     PROCEDURES PERFORMED:    1.  Open reduction internal fixation of left parasymphysis fracture  2.  Application of arch bars      ANESTHESIA:      1.  General anesthesia was administered via a nasoendotracheal tube.    2.  Adjunctive local anesthesia with 10ml of 0.25% Marcaine with 1:200,000 epinephrine was administered via local infiltration.        INDICATION FOR THE PROCEDURES:  22 year old man who fell last night onto his chin and woke up with pain in his jaw and presented to [facility]. CT showed bilateral subcondylar fractures and left displaced parasymphysis fracture.  The risks of procedure including pain, swelling, bleeding, infection, damage to adjacent teeth or structures, temporary or permanent paresthesia, anesthesia or dysesthesia, need for maxillomandibular fixation, need for hardware removal or the potential need for occlusal adjustments.  The patient verbalized understanding risks of procedure and informed consent was then placed in patient's chart.        DESCRIPTION OF PROCEDURE:     The patient was met preoperatively and reviewed by the Oral Maxillofacial Surgery Service as well as the Anesthesia Service.  The patient was then taken to operating room #3 by the Anesthesia Service for administering of general anesthesia.  The patient transferred himself from the [facility] to the operating table, was placed in the supine position.  Standard ASA monitors were applied.  The patient was appropriately tucked and padded.  The patient underwent a smooth IV induction and placement of a nasal endotracheal tube.  The tube was then secured in the standard fashion by the Oral Maxillofacial Surgery Service. The  placement of the tube was confirmed with bilateral breath sounds and end tidal CO2.  Prior to prepping, a throat pack was placed.  Chlorhexidine toothbrush was used to clean the patient's oral cavity.  Local anesthesia was then administered as noted above.  The patient was then prepped with Betadine scrub and paint.  The patient was then prepped and draped in a customary and sterile fashion.  A timeout was performed according to [facility] [place] protocol.      Arch bars were placed in standard fashion to the maxilla and mandible.  Next the electrocautery was used to make an incision from canine to canine 0.5cm in the mandibular vestibule.  Periosteal elevator was used to reflect the periosteum to the inferior border of the mandible.  This was tunneled down the left inferior border and the mental nerve was identified.  The nerve was protected from the superior and electrocautery used to extend the incision to the first molar.  Reflected periosteum exposing the inferior border.  Next the patient was placed into MMF with 26g wire x4.  Bone reduction forceps placed and reduced the fracture to anatomical reduction.  6 hole 2.0mm plate was adapted to the bone.  The bone was secured with 12,14, and 16 mm screws.  A nerve hook was used to reflect the mental nerve while placing the adjacent screws.  The mental nerve was intact.  The sites were irrigated with copious sterile saline.  2-0 vicryl suture x2 used to reapproximate the mentalis. Closed with running and interrupted 3-0 cg suture.       The oral cavity was then irrigated and suctioned.  Throat pack was removed with suction.  An orogastric tube was passed and removed to suction the stomach contents. Needle and sponge counts noted to be correct (x2). Single elastic was placed bilaterally distal to the canine. The patient was then turned over to the Anesthesia Service for a smooth emergence from anesthesia and extubation in the operating  room.  The patient was noted to be breathing spontaneously and was transferred to the PACU in stable condition.      Patient planned for admission post-operatively for monitoring and pain control.      Dr. Juventino Gonzalez was scrubbed and present for the entire procedure.      ESTIMATED BLOOD LOSS:  50 mL.        FLUIDS:  1300 ml of crystalloid.        DRAINS:  None.        COMPLICATIONS:  None.        SPECIMENS:  None.        FINDINGS/IMPLANTS:  Anatomical reduction of left parasymphysis fracture.  Stable and repeatable occlusion.

## 2017-08-12 NOTE — ED NOTES
Pt in with C/O trip and fall down 3 stairs tonight. Pt denies LOC. Pt A&O x 4 on arrival. Pt has a deformity to the jaw. Pt has multiple abrasions to the jaw. Pt's pupils are unequal, reactive to light and accommodating. Pt reports he normally has unequal pupils

## 2017-08-12 NOTE — BRIEF OP NOTE
[facility], [employer]    Brief Operative Note: ORIF of L parasymphysis fracture and placement of arch bars for MMF.     Pre-operative diagnosis: Bilateral Mandible Fracture  Post-operative diagnosis Bilateral mandible Fracture  Procedure: Procedure(s):  Open Reduction Internal Fixation Leftl Mandible Fracture, Arch bars application - Wound Class: I-Clean  Surgeon: Surgeon(s) and Role:     * Juventino Gonzalez DDS - Primary     * Rodriguez Rosario DDS - Resident - Assisting     * Juventino Graham MD - Resident - Assisting  Anesthesia: General   Estimated blood loss: Less than 50 ml  Drains: None  Specimens: * No specimens in log *  Findings:   None.  Complications: None.  Implants: None.

## 2017-08-12 NOTE — LETTER
[facility] MAIN OR  [address] [facility] [address]  Phone: [phone]    August 12, 2017        Freddy Lange  [address] [place] [address]          To whom it may concern: Freddy was seen at [facility] Emergency department for bilateral mandibular fractures on 8/12/17 for which he underwent open reduction with internal fixation of these fractures and in the process received Narcotic pain medication during anesthesia as well as to take for one week postoperatively    RE: Freddy Lange    Patient may return to work on 8/15/17 with the following:  Light duty-unable to lift more than 20 pounds for 2 weeks.    Please contact me for questions or concerns.      Sincerely, Juventino Graham, DDS  [phone]

## 2017-08-12 NOTE — ED PROVIDER NOTES
"  History     Chief Complaint:  Fall      The history is provided by the patient.      Freddy Lange is a 22 year old male who presents to the emergency department today for evaluation after a fall. The patient reports that he tripped while walking outside and fell down three stairs which caused him to hit his jaw. The patient reports that he already has two chipped teeth but that there are additional tooth issues following the fall and his jaw is misaligned. He states that he had a couple of drinks earlier this evening. The patient denies any neck pain.      Allergies:  No Known Drug Allergies      Medications:    The patient is currently on no regular medications.     Past Medical History:    History reviewed. No pertinent past medical history.     Past Surgical History:    History reviewed. No pertinent past surgical history.     Family History:    Diabetes  Cardiovascular    Social History:  The patient was accompanied to the ED by a girlfriend.  Smoking Status: Rare  Smokeless Tobacco: Yes (Chew)  Alcohol Use: Yes  Marital Status:  Single     Review of Systems   HENT: Positive for dental problem.         Jaw misalignment   Musculoskeletal: Negative for neck pain.   Skin: Positive for wound (facial abrasions).   All other systems reviewed and are negative.    Physical Exam   First Vitals:  BP: (!) 136/104  Temp: 98.1  F (36.7  C)  Resp: (!) 130  Height: 177.8 cm (5' 10\")  Weight: 77.1 kg (170 lb)  SpO2: 98 %    Physical Exam  Constitutional:  Patient appears well-developed and well-nourished.   HENT:   Head: No obvious external signs of trauma noted. Head is without raccoon's eyes and without Bal's sign. No external signs of basilar skull fracture. No signs of facial bone instability.  Nose: No nose lacerations, nasal deformity, septal deviation or nasal septal hematoma. No epistaxis.   Mouth/Throat: Uvula is midline, oropharynx is clear and moist and mucous membranes are normal.   Jaw: Notable fracture " and deformity near the left lower canine.  Eyes: Conjunctivae and EOM are normal. Pupils are equal, round, and reactive to light.   Neck: Trachea normal, normal range of motion and full passive range of motion without pain. Neck supple. No spinous process tenderness present. No tracheal deviation present.   Cardiovascular: Normal rate, regular rhythm, S1 normal, S2 normal and normal pulses.   Pulmonary/Chest: Effort normal and breath sounds normal. No accessory muscle usage. No respiratory distress. Patient has no decreased breath sounds. Patient has no wheezes. Patient has no rhonchi. Patient has no rales. Patient exhibits no bony tenderness and no retraction.   Abdominal: Soft. Normal appearance and bowel sounds are normal. Patient exhibits no distension. There is no tenderness. There is no rebound.   Musculoskeletal:        Cervical back: Normal.        Thoracic back: Normal.        Lumbar back: Normal.   Extremities:  No deformities noted.  Neurological: Patient is alert and oriented to person, place, and time. Patient has normal strength. Patient is not disoriented. No cranial nerve deficit or sensory deficit. GCS eye subscore is 4. GCS verbal subscore is 5. GCS motor subscore is 6.   Skin: Skin is warm, dry and intact.     Emergency Department Course     Imaging:  Radiology findings were communicated with the patient who voiced understanding of the findings.    Cervical spine CT w/o contrast  1. No cervical spine fracture or malalignment.  2. Bilateral mandibular fracture.  Report per radiology     Maxillofacial CT w/o contrast  IMPRESSION:  1. Fracture of the left mandibular mentum and bilateral mandibular  rami.  2. Nasal bone deformity is age indeterminate.  Report per radiology     CT Head w/o Contrast  IMPRESSION: No acute abnormality.  Report per radiology     Laboratory:  Laboratory findings were communicated with the patient who voiced understanding of the findings.  CBC: AWNL. (WBC 10.9, HGB 16.0, PLT  312)   BMP: Chloride 110 (H), Glucose 117 (H) o/w WNL (Creatinine 1.05)  Alcohol ethyl: 0.22 (H)     Interventions:  0444: Morphine 4mg IV  0523: Morphine 4mg IV  0523: NS 1,000mL IV     Emergency Department Course:  Nursing notes and vitals reviewed.  0429: I performed an exam of the patient as documented above.   The patient was sent for a Cervical spine CT w/o contrast, Maxillofacial CT w/o contrast and CT Head w/o Contrast while in the emergency department, results above.   IV was inserted and blood was drawn for laboratory testing, results above.   0556: I spoke with Dr. Graham of the Oral Surgery service from the [facility] regarding patient's presentation, findings, and plan of care.   0601: Patient rechecked and updated.    I discussed the treatment plan with the patient. They expressed understanding of this plan and consented to transfer. He will bel admitted to the emergency department at the [facility].   I personally reviewed the laboratory and imaging results with the Patient and answered all related questions prior to transfer.    Impression & Plan      Medical Decision Making:  Freddy Lange is a 22 year old male in Room 10. The patient presents to the ER for evaluation of jaw pain. The patient was drinking, tripped and fell. He broke both of his mandibular rami and the mandibular mentum on the left. I discussed the case with Oral Surgery at the [facility] and will transfer the patient to the ER for further evaluation. The patient prefers to go by private vehicle and he does have a sober ride to take him there. I have advised him to go to directly to the emergency department as he will likely need surgery today. I have stressed that he is not to eat or drink anything until he arrives there. Anticipatory guidance given prior to transfer.    Diagnosis:    ICD-10-CM    1. Fracture of ramus of mandible, unspecified laterality, initial encounter (H) S02.640A       Disposition:  Transferred to the [facility] ER   Scribe Disclosure:  I, Mahsa Glynn, am serving as a scribe at 4:24 AM on 8/12/2017 to document services personally performed by Jose Vaca DO based on my observations and the provider's statements to me.    8/12/2017   [facility] EMERGENCY DEPARTMENT       Jose Vaca DO  08/12/17 0650

## 2017-08-12 NOTE — ED PROVIDER NOTES
History     Chief Complaint   Patient presents with     Jaw Pain     HPI  Freddy Lange is a 22 year old male presenting to the Emergency Department from [facility] with jaw pain. Patient was told at [facility] that he has a fractured mandible and was referred here for evaluation. He states he tripped while leaving a trailer and fell down three stairs which caused him to hit his jaw. This occurred between 2:00AM-3:00AM The patient reports that he already has two chipped teeth but that there are additional tooth issues following the fall and his jaw is misaligned. He stated that he had a couple of drinks earlier yesterday evening. The patient denies any neck or back  Pain.  He is otherwise healthy    Social social: Here with his girlfriend and brother    History reviewed. No pertinent past medical history.    Past Surgical History:   Procedure Laterality Date     NO HISTORY OF SURGERY         Family History   Problem Relation Age of Onset     DIABETES Father      DIABETES Mother      Unknown/Adopted Maternal Grandfather      Cancer - colorectal Paternal Grandmother      Unknown/Adopted Paternal Grandfather      Cardiovascular Brother        Social History   Substance Use Topics     Smoking status: Passive Smoke Exposure - Never Smoker     Smokeless tobacco: Never Used      Comment: mom smoke in the house     Alcohol use No       No current facility-administered medications for this encounter.      Current Outpatient Prescriptions   Medication     ibuprofen (ADVIL,MOTRIN) 800 MG tablet     NO ACTIVE MEDICATIONS      No Known Allergies    I have reviewed the Medications, Allergies, Past Medical and Surgical History, and Social History in the Epic system.    Review of Systems   Constitutional: Negative for fever.   HENT:        Jaw pain   Respiratory: Negative for shortness of breath.    Cardiovascular: Positive for chest pain.   Gastrointestinal: Negative for nausea and vomiting.   All other systems reviewed and are  "negative.      Physical Exam   BP: 123/84  Pulse: 120  Temp: 98.7  F (37.1  C)  Resp: 16  Height: 180.3 cm (5' 11\")  Weight: 77.1 kg (170 lb)  SpO2: 100 %  Physical Exam  Physical Exam   Constitutional:   well nourished, well developed, resting comfortably joking  HENT:   Head: Normocephalic  Eyes: Conjunctivae are normal. Pupils are equal, round, and reactive to light.   Bilateral Mandible fracture with some blood in mouth  Cardiovascular: regular rate and rhythm without murmurs or gallops  Pulmonary/Chest: Clear to auscultation bilaterally, with no wheezes or retractions. No respiratory distress.  GI: Soft with good bowel sounds.  Non-tender, non-distended, with no guarding, no rebound  Musculoskeletal:  no edema or clubbing   Skin: Skin is warm and dry. No rash noted.   Neurological: alert and oriented to person, place, and time. Nonfocal exam  Psychiatric:  normal mood and affect.   ED Course   8:14 AM  The patient was seen and examined by Carmita Gracia MD in Room 3.     ED Course     Procedures             Critical Care time:  none               Labs Ordered and Resulted from Time of ED Arrival Up to the Time of Departure from the ED - No data to display  Medications   ampicillin-sulbactam (UNASYN) 3 g vial to attach to  mL bag (not administered)   Tdap (tetanus-diphtheria-acell pertussis) (ADACEL) injection 0.5 mL (not administered)   morphine (PF) injection 4 mg (4 mg Intravenous Given 8/12/17 0829)           Assessments & Plan (with Medical Decision Making)           I have reviewed the nursing notes.  Emergency Department course:  The patient was seen and examined at 0814 am.  I treated him with morphine IV for pain, as well as Unasyn IV for open mandible fracture.  Per Chart Review ([place]): \"The patient was drinking, tripped and fell. He broke both of his mandibular rami and the mandibular mentum on the left. I discussed the case with Oral Surgery at the [facility] and will transfer the " "patient to the ER for further evaluation. The patient prefers to go by private vehicle and he does have a sober ride to take him there. I have advised him to go to directly to the emergency department as he will likely need surgery today. I have stressed that he is not to eat or drink anything until he arrives there. Anticipatory guidance given prior to transfer.\"    Per [facility]: radiology studies obtained at [facility] today 8/12/2017     Cervical spine CT w/o contrast  1. No cervical spine fracture or malalignment.  2. Bilateral mandibular fracture.  Report per radiology      Maxillofacial CT w/o contrast  IMPRESSION:  1. Fracture of the left mandibular mentum and bilateral mandibular  rami.  2. Nasal bone deformity is age indeterminate.  Report per radiology      CT Head w/o Contrast  IMPRESSION: No acute abnormality.    The patient was seen by oral surgery in the ED shortly after arrival to the [place] ED.  He will be admitted to the oral surgery service under the care of Dr. Gonzalez.  He has received Unasyn IV as well as morphine IV for repeat doses. He also received a dap vaccination.   I also informed Trauma regarding the patient.   I have reviewed the findings, diagnosis, plan and need for follow up with the patient.    New Prescriptions    No medications on file       Final diagnoses:   Bilateral mandibular fracture, closed, initial encounter (H)   I, Yenni Garcia, am serving as a trained medical scribe to document services personally performed by Carmita Gracia MD, based on the provider's statements to me.      ICarmita MD, was physically present and have reviewed and verified the accuracy of this note documented by Yenni Garcia.   This note was created in part by the use of Dragon voice recognition dictation system. Inadvertent grammatical errors and typographical errors may still exist.  Carmita Gracia MD    8/12/2017   [facility], [place], EMERGENCY DEPARTMENT     Carmita Gracia MD  08/12/17 1204    "

## 2017-08-12 NOTE — H&P
ORAL & MAXILLOFACIAL SURGERY H&P  Name: Freddy Lange  MRN: [id number]  : 1995  Date of Service: 2017    ID:  22 year old male patient referred by [facility] ED for evaluation of mandibular fractures s/p fall    Assessment/Plan:  22 year old male s\p fall with facial fractures including bilateral subcondylar, and L parasymphisis. We anticipate operative treatment of these fractures.     Admit for Observation   IV ABX (Unasyn)  NPO  HOB 30   Continuous pulse oximetry  To OR today or tomorrow  [facility] resident on call for questions or concerns      The patient's case was discussed with Chief Resident, Dr. Rosario who discussed with staff surgeon, Dr. Gonzalez.      Chief Complaint:  Mandible fracture.    History of Present Illness:  This is a 22 year old  male patient s/p fall where he landed on his chin. This happened around 0300 this morining. Does report that he was drinking EtOH at that time.  There was no loss of consciousness.  The patient reports/denies f/c/n/v/sob, dysphagia or dyspnea.       No Known Allergies    Patient Active Problem List   Diagnosis     Viral warts     Viral infection     Mandibular fracture (H)       History reviewed. No pertinent past medical history.    Past Surgical History:   Procedure Laterality Date     NO HISTORY OF SURGERY           Relevant Patient or Family Anesthesia History:  Anesthetic/Sedation History: Denies past adverse experience with sedation/anesthesia      Current Facility-Administered Medications:      ampicillin-sulbactam (UNASYN) 3 g vial to attach to  mL bag, 3 g, Intravenous, Once, Carmita Gracia MD    Current Outpatient Prescriptions:      ibuprofen (ADVIL,MOTRIN) 800 MG tablet, Take 1 tablet (800 mg) by mouth every 8 hours as needed for moderate pain, Disp: 30 tablet, Rfl: 0     NO ACTIVE MEDICATIONS, , Disp: , Rfl:     Social History     Social History     Marital status: Single     Spouse name: N/A     Number of children: N/A     Years of  education: N/A     Occupational History     Not on file.     Social History Main Topics     Smoking status: Passive Smoke Exposure - Never Smoker     Smokeless tobacco: Never Used      Comment: mom smoke in the house     Alcohol use No     Drug use: No     Sexual activity: No     Other Topics Concern     Not on file     Social History Narrative       Family History   Problem Relation Age of Onset     DIABETES Father      DIABETES Mother      Unknown/Adopted Maternal Grandfather      Cancer - colorectal Paternal Grandmother      Unknown/Adopted Paternal Grandfather      Cardiovascular Brother        Review of Systems:  As per HPI, otherwise noncontributory    Clinical Exam:  B/P: 140/89, T: 98.7, P: 118, R: 16    Lab Results   Component Value Date    WBC 10.9 08/12/2017    HGB 16.0 08/12/2017    HCT 48.1 08/12/2017     08/12/2017     08/12/2017    POTASSIUM 4.1 08/12/2017    CHLORIDE 110 (H) 08/12/2017    CO2 25 08/12/2017    BUN 10 08/12/2017    CR 1.05 08/12/2017     (H) 08/12/2017    TROPI <0.012 03/03/2009    AST 32 03/03/2009    ALT 13 03/03/2009    ALKPHOS 356 03/03/2009    BILITOTAL 0.3 03/03/2009     ROS: As per HPI otherwise noncontributory    Clinical Exam:     Patient is A&O x 3   There is no Edema and ecchymosis    No Facial lacerations    Extraocular movements intact   Pupils equal and reactive    Sub conjunctival hemorrhage not present   Orbital rims intact   Nose without crepitus   Nose midline   Septal hematoma not present,  left septal deviation   Maxilla stable    No missing teeth.   Teeth #9 and 24 are fractured.   Occlusion is able to be assessed and is off with premature left posterior occlusion   Floor of the mouth ecchymosis is present   Bony step-offs were noted on the mandibular L parasymphysis region   There is a displaced fracture near teeth #21 and 22.     The fracture is mobile.   Active bleeding not present   CN II-XII grossly intact except for L V3  hypoesthesia      Radiographic Exam:   Maxillofacial CT reveals fracture of the bilateral subcondylar fractures as well as L parasymphysis fracture. There is a radiolucency inferior to tooth #17 below the inferior alveolar nerve likely stafne defect. Also radiolucency distal to tooth #17. Age indeterminate bilateral nasal bone fractures with deviated septum. No other bony pathology noted.      Risks, complications and alternatives of surgical repair were discussed and questions were answered.  Among all potential complications, we emphasized pain, bleeding, swelling, infection, hardware failure, adverse facial esthetic change, scarring, temporary and permanent inferior alveolar, and lingual nerve injury, malocclusion, need for additional procedures including revision surgery and/or stages surgery, the need for possible maxillomandibular fixation, the need for limited or decreased function, damage to adjacent teeth and tissue, use of bone grafting or other grafts, anesthetic mishap, and death.          Nelson Graham DDS

## 2017-08-12 NOTE — DISCHARGE INSTRUCTIONS
[facility]  Same-Day Surgery   Adult Discharge Orders & Instructions     For 24 hours after surgery    1. Get plenty of rest.  A responsible adult must stay with you for at least 24 hours after you leave the hospital.   2. Do not drive or use heavy equipment.  If you have weakness or tingling, don't drive or use heavy equipment until this feeling goes away.  3. Do not drink alcohol.  4. Avoid strenuous or risky activities.  Ask for help when climbing stairs.   5. You may feel lightheaded.  IF so, sit for a few minutes before standing.  Have someone help you get up.   6. If you have nausea (feel sick to your stomach): Drink only clear liquids such as apple juice, ginger ale, broth or 7-Up.  Rest may also help.  Be sure to drink enough fluids.  Move to a regular diet as you feel able.  7. You may have a slight fever. Call the doctor if your fever is over 100 F (37.7 C) (taken under the tongue) or lasts longer than 24 hours.  8. You may have a dry mouth, a sore throat, muscle aches or trouble sleeping.  These should go away after 24 hours.  9. Do not make important or legal decisions.   Call your doctor for any of the followin.  Signs of infection (fever, growing tenderness at the surgery site, a large amount of drainage or bleeding, severe pain, foul-smelling drainage, redness, swelling).    2. It has been over 8 to 10 hours since surgery and you are still not able to urinate (pass water).    3.  Headache for over 24 hours.      To contact a doctor, call Dr. Gonzalez office @ [phone]   or:  X   [phone] and ask for the resident on call for Oral Surgery  (answered 24 hours a day)  X   Emergency Department:  [facility]: [phone]       (TTY for hearing impaired: [phone])

## 2017-08-13 NOTE — ANESTHESIA POSTPROCEDURE EVALUATION
Patient: Freddy Lange    Procedure(s):  Open Reduction Internal Fixation Leftl Mandible Fracture, Arch bars application - Wound Class: I-Clean    Diagnosis:Bilateral Mandible Fracture  Diagnosis Additional Information: No value filed.    Anesthesia Type:  No value filed.    Note:  Anesthesia Post Evaluation    Patient location during evaluation: PACU  Patient participation: Able to fully participate in evaluation  Level of consciousness: awake  Pain management: adequate  Airway patency: patent  Cardiovascular status: acceptable  Respiratory status: acceptable  Hydration status: acceptable  PONV: none     Anesthetic complications: None          Last vitals:  Vitals:    08/12/17 1915 08/12/17 1930 08/12/17 1945   BP: 143/83 142/81 (!) 142/93   Pulse:      Resp: 13 14 16   Temp:  36.7  C (98.1  F)    SpO2: 100% 100%          Electronically Signed By: Thang Antonio MD  August 12, 2017  8:13 PM

## 2017-08-13 NOTE — OR NURSING
Paged Dr. Graham as rx's were sent to pharmacy that closes at 1800 and pt unable to .  Rx's called in to 24 hour [facility] in [place].  [place] and nicotine patch written rx's given to patient and his girlfriend, Katrin.

## 2017-08-13 NOTE — ANESTHESIA CARE TRANSFER NOTE
Patient: Freddy Lange    Procedure(s):  Open Reduction Internal Fixation Leftl Mandible Fracture, Arch bars application - Wound Class: I-Clean    Diagnosis: Bilateral Mandible Fracture  Diagnosis Additional Information: No value filed.    Anesthesia Type:   No value filed.     Note:  Airway :Nasal Cannula  Patient transferred to:PACU  Comments: Anesthesia Care Transfer Note    Patient: Freddy Lange    Transferred to: PACU    Patient vital signs: stable    Airway: none    Monitors on, VSS, stable spontaneous respirations  Alden Lantigua CRNA  8/12/2017 7:08 PM            Vitals: (Last set prior to Anesthesia Care Transfer)    CRNA VITALS  8/12/2017 1828 - 8/12/2017 1908      8/12/2017             Resp Rate (set): 10                Electronically Signed By: BRENNAN Macedo CRNA  August 12, 2017  7:08 PM

## 2017-08-14 ENCOUNTER — TELEPHONE (OUTPATIENT)
Dept: FAMILY MEDICINE | Facility: OTHER | Age: 22
End: 2017-08-14

## 2017-08-14 NOTE — TELEPHONE ENCOUNTER
ED / Discharge Outreach Protocol    Patient Contact    Attempt # 1    Was call answered?  No.  Left message on voicemail with information to call me back.    Giselle Mendoza, RN, BSN

## 2017-08-14 NOTE — TELEPHONE ENCOUNTER
"Hospital/TCU/ED for chronic condition Discharge Protocol    \"Hi, my name is Giselle Mendoza, a registered nurse, and I am calling from [facility] [place].  I am calling to follow up and see how things are going for you after your recent emergency visit/hospital/TCU stay.\"    Tell me how you are doing now that you are home?\" \"Not too bad. Woke up this morning and my jaw hurts bad\"      Discharge Instructions    \"Let's review your discharge instructions.  What is/are the follow-up recommendations?  Pt. Response: follow up with [facility] this week    \"Has an appointment with your primary care provider been scheduled?\"   No (schedule appointment), declined an appointment with [facility]    \"When you see the provider, I would recommend that you bring your medications with you.\"    Medications    \"Tell me what changed about your medicines when you discharged?\"    Changes to chronic meds?    0-1    \"What questions do you have about your medications?\"    None     New diagnoses of heart failure, COPD, diabetes, or MI?    No       Medication reconciliation completed? Yes  Was MTM referral placed (*Make sure to put transitions as reason for referral)?   No    Call Summary    \"What questions or concerns do you have about your recent visit and your follow-up care?\"     none    \"If you have questions or things don't continue to improve, we encourage you contact us through the main clinic number (give number).  Even if the clinic is not open, triage nurses are available 24/7 to help you.     We would like you to know that our clinic has extended hours (provide information).  We also have urgent care (provide details on closest location and hours/contact info)\"      \"Thank you for your time and take care!\"       Giselle Mendoza RN, BSN       "

## 2017-08-14 NOTE — TELEPHONE ENCOUNTER
RN to call for hospital follow up:    Reason for follow up: Freddy Lange appeared on our list for being seen in an Emergency Room or a recent Hospital discharge.    Admitting date: 08/12/2017  Discharge date: 08/12/2017  Location: [facility]  Reason for visit: Bilateral Fracture Of Mandible, Open, Initial Encounter (H)    Giselle Mendoza RN, BSN

## 2018-05-04 ENCOUNTER — OFFICE VISIT (OUTPATIENT)
Dept: CARDIOLOGY | Facility: CLINIC | Age: 23
End: 2018-05-04
Payer: COMMERCIAL

## 2018-05-04 ENCOUNTER — RADIANT APPOINTMENT (OUTPATIENT)
Dept: CARDIOLOGY | Facility: CLINIC | Age: 23
End: 2018-05-04
Payer: COMMERCIAL

## 2018-05-04 VITALS
HEIGHT: 70 IN | HEART RATE: 80 BPM | DIASTOLIC BLOOD PRESSURE: 81 MMHG | BODY MASS INDEX: 25.77 KG/M2 | OXYGEN SATURATION: 98 % | SYSTOLIC BLOOD PRESSURE: 123 MMHG | WEIGHT: 180 LBS

## 2018-05-04 DIAGNOSIS — I30.0 ACUTE IDIOPATHIC PERICARDITIS: Primary | ICD-10-CM

## 2018-05-04 DIAGNOSIS — I30.0 ACUTE IDIOPATHIC PERICARDITIS: ICD-10-CM

## 2018-05-04 DIAGNOSIS — R07.89 ATYPICAL CHEST PAIN: ICD-10-CM

## 2018-05-04 DIAGNOSIS — R00.2 PALPITATIONS: ICD-10-CM

## 2018-05-04 PROCEDURE — 0298T ZZC EXT ECG > 48HR TO 21 DAY REVIEW AND INTERPRETATN: CPT | Performed by: INTERNAL MEDICINE

## 2018-05-04 PROCEDURE — 93325 DOPPLER ECHO COLOR FLOW MAPG: CPT | Performed by: INTERNAL MEDICINE

## 2018-05-04 PROCEDURE — 93321 DOPPLER ECHO F-UP/LMTD STD: CPT | Performed by: INTERNAL MEDICINE

## 2018-05-04 PROCEDURE — 93308 TTE F-UP OR LMTD: CPT | Performed by: INTERNAL MEDICINE

## 2018-05-04 PROCEDURE — 0296T ZZHC  EXT ECG > 48HR TO 21 DAY RCRD W/CONECT INTL RCRD: CPT | Performed by: INTERNAL MEDICINE

## 2018-05-04 PROCEDURE — 99204 OFFICE O/P NEW MOD 45 MIN: CPT | Mod: 25 | Performed by: INTERNAL MEDICINE

## 2018-05-04 RX ORDER — COLCHICINE 0.6 MG/1
0.6 TABLET ORAL
COMMUNITY
Start: 2018-04-17

## 2018-05-04 ASSESSMENT — PAIN SCALES - GENERAL: PAINLEVEL: NO PAIN (0)

## 2018-05-04 NOTE — PROGRESS NOTES
May 4, 2018     I had the pleasure of seeing Freddy Lange  in the [facility] for further evaluation and management of his pericarditis. While he has no known relevant past medical history he presented to an outside emergency room early May with complaints of 4 day history of intermittent sharp chest pain.  The pain was described as sharp lasting a few seconds to 102 minutes and usually resolves on its own.  It was positional seem to get worse when he was laying flat.  There were no prodrome or any vital symptoms before this event.  It does not appear that the pain was reproducible although he shoveled quite a bit of snow the days prior.  EKG was obtained and this showed diffuse ST segment elevation and repeat troponin was negative.  He was started on colchicine 0.6 mg daily in addition to ibuprofen 600 mg every 6 hours and was discharged home.  Today he presents to clinic for follow-up.  Notes that since his discharge from the emergency room he has been feeling better, he did not have any further episodes of shortness of breath.  However he continued to have with decreasing frequency and intensity sharp episodes of chest pain that lasted a few seconds to half a minute.  They are located usually around the stent on the left side of the chest.  They seem to be worse when he is laying down.  Again these episodes have improved significantly since his initial presentation to the outside emergency room.  Notes that he has not been taking his colchicine as the cost is prohibitive.  He has been taking ibuprofen at times however he does not feel it helps so he does not take it very frequently.  He denies any other complaints no lower extremity edema PND or orthopnea.  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  As noted above he denies any fevers chills upper respiratory symptoms prior to his initial presentation.  His girlfriend might have had some viral infection however  does not think that he got it.  He denies any current smoking he has occasional alcohol mainly.  Over the weekends and denies any illicit drug use.  Note the family history is significant for twins in the past who turned out to have coarctation of the aorta with bicuspid aortic valve.  These were diagnosed late at the age of 14 per mom.  No familial screening has been recommended or performed.     PAST MEDICAL HISTORY:  - No relevant past cardiac history other than an episode of palpitations when he did have a stress test that was performed for PVCs and was reportedly negative.    FAMILY HISTORY:  Family History   Problem Relation Age of Onset     DIABETES Father      DIABETES Mother      Unknown/Adopted Maternal Grandfather      Cancer - colorectal Paternal Grandmother      Unknown/Adopted Paternal Grandfather      Cardiovascular Brother      SOCIAL HISTORY:  Social History     Social History     Marital status: Single     Spouse name: N/A     Number of children: N/A     Years of education: N/A     Social History Main Topics     Smoking status: Passive Smoke Exposure - Never Smoker     Smokeless tobacco: Never Used      Comment: mom smoke in the house     Alcohol use No     Drug use: No     Sexual activity: No     Other Topics Concern     Not on file     Social History Narrative     CURRENT MEDICATIONS:  Current Outpatient Prescriptions   Medication     chlorhexidine (PERIDEX) 0.12 % solution     nicotine (NICODERM CQ) 7 MG/24HR 24 hr patch     No current facility-administered medications for this visit.      ROS:   Constitutional: No fever, chills, or sweats. Weight is 0 lbs 0 oz  ENT: No visual disturbance, ear ache, epistaxis, sore throat.   Allergies/Immunologic: Negative.   Respiratory: No cough, hemoptysis.   Cardiovascular: As per HPI.   GI: No nausea, vomiting, hematemesis, melena, or hematochezia.   : No urinary frequency, dysuria, or hematuria.   Integrument: Negative.   Psychiatric: No evidence of  "major depression  Neuro: No new neurological complaints at this time. Non focal  Endocrinology: Negative.   Musculoskeletal: As per HPI.      EXAM:  /81 (BP Location: Left arm, Patient Position: Chair, Cuff Size: Adult Regular)  Pulse 80  Ht 1.765 m (5' 9.5\")  Wt 81.6 kg (180 lb)  SpO2 98%  BMI 26.2 kg/m2  General: appears comfortable, alert and oriented. Mom present at bedside for the evaluation.  Head: normocephalic, atraumatic  Eyes: anicteric sclera, EOMI , PERRL  Neck: no adenopathy  Orophyarynx: moist mucosa, no lesions noted  Heart: regular, S1/S2, no murmurs, rubs or gallop. Estimated JVP at 5 cmH2O. No pericardial rub  Lungs: CTAB, No wheezing.   Abdomen: soft, non-tender, bowel sounds present, no hepatosplenomegaly  Extremities: No LE edema today  Skin: no open lesions noted  Neuro: grossly non-focal  Psych: no evidence of depression noted     Labs:  Lab Results   Component Value Date    WBC 10.9 08/12/2017    HGB 16.0 08/12/2017    HCT 48.1 08/12/2017     08/12/2017     08/12/2017    POTASSIUM 4.1 08/12/2017    CHLORIDE 110 (H) 08/12/2017    CO2 25 08/12/2017    BUN 10 08/12/2017    CR 1.05 08/12/2017     (H) 08/12/2017    TROPI <0.012 03/03/2009    AST 32 03/03/2009    ALT 13 03/03/2009    ALKPHOS 356 03/03/2009    BILITOTAL 0.3 03/03/2009      ASSESSMENT AND PLAN:  In summary, patient is a 23 year old male with above past medical history including a negative stress test reportedly back in 2009 that was performed for PVCs and family history of coarctation of the aorta with bicuspid aortic valve with siblings here for pericarditis follow-up that was diagnosed in outside emergency room.  Unfortunately ECG from the time of the visit was not available for review however per chart review diffuse ST elevations were noted with negative troponin he was discharged on colchicine and ibuprofen however he has not been taking these mostly due to financial constraints.  Since the visit " he notes that his complaints are better and frequency and intensity of his chest pains are much better.  We will obtain an EKG here in the office and then we will refer him for a cardiac MRI to further evaluate his pericardium as well as to see if he has any bicuspid valve or coarctation of the aorta.  At this time he is not willing to continue with the colchicine.  Given his infrequent episodes of palpitations we also discussed that we will place a ZioPatch monitor to evaluate for any underlying arrhythmias possible high PVC burden.  We will review these and get back to him as soon as possible.  In the meantime we will not start any medications.  I will see him back as soon as the results are back.  In addition given the history of bicuspid valve and coarctation of the aorta I discussed with the patient's mom that family screening is strongly recommended to ensure no other family members are affected with the disease.  She verbalized understanding.    Follow up:   After MRI    I appreciate the opportunity to participate in the care of Freddy Lange . Please do not hesitate to contact me with any further questions.    Sincerely,   Dennis Goss MD     [facility] Division of Cardiology

## 2018-05-04 NOTE — NURSING NOTE
Cardiac Monitors: Patient was instructed regarding the indication, function, care and prompt return of a 7 day zio holter  monitor. The monitor was placed on the patient with instructions regarding care of the skin electrodes and monitor, as well as documentation in the patient diary. Patient demonstrated understanding of this information and agreed to call with further questions or concerns.    Limited echo completed today.    Cardiac Testing: Patient given instructions regarding cardiac MRI on 5-29 at 7am at [facility]. Discussed purpose, preparation, procedure and when to expect results reported back to the patient. Patient demonstrated understanding of this information and agreed to call with further questions or concerns.    Med Reconcile: Reviewed and verified all current medications with the patient. The updated medication list was printed and given to the patient.    Return Appointment: Patient given instructions regarding scheduling next clinic visit, depending on results of zio and MRI. Patient demonstrated understanding of this information and agreed to call with further questions or concerns.    Patient stated he understood all health information given and agreed to call with further questions or concerns.    Jessica Fuentes RN

## 2018-05-04 NOTE — NURSING NOTE
Per Dr. Goss, patient to have 7 day ziopatch monitor placed.  Diagnosis: Palpitation  Monitor placed: Yes  Patient Instructed: Yes  Patient verbalized understanding: Yes  Holter # [id number]      Placed by Hernán Burgos

## 2018-05-04 NOTE — MR AVS SNAPSHOT
After Visit Summary   5/4/2018    Freddy Lange    MRN: [id number]           Patient Information     Date Of Birth          1995        Visit Information        Provider Department      5/4/2018 2:40 PM Dennis Goss MD [facility] PHYSICIANS HEART AT [facility]        Today's Diagnoses     Pericarditis    -  1    Atypical chest pain          Care Instructions    Thank you for coming to the [facility] @ [facility]; please note the following instructions:    1. No medication changes today.    2. Dr. Dennis Goss has ordered a Cardiac MRI to be performed on you. We have scheduled this test at the [facility] Imaging Department ([address]) on Tuesday, May 29th, 2018 at 7:00am.  Please arrive 15 minutes early to the [facility] lobby to allow enough time for registration.     CARDIAC MRI INSTRUCTIONS:    *No zippers or metal on body.  *Otherwise no prep.  *Allow 2 hours for test.      3. Follow up will be determined by MRI results.    If you have any questions regarding your visit please contact your care team:     Cardiology  Telephone Number   Leni COLMENARES, DARRIUS CARPENTER, DARRIUS KEMP, MIRZA PARKS MA   [phone]    *After hours: [phone]   For scheduling appts:     [phone] or    [phone] (select option 1)    *After hours: [phone]     For the Device Clinic (Pacemakers and ICD's)  RN's :  Ally Young   During business hours: [phone]    *After business hours:  [phone] (select option 4)      Normal test result notifications will be released via tagWALLET or mailed within 7 business days.  All other test results, will be communicated via telephone once reviewed by your cardiologist.    If you need a medication refill please contact your pharmacy.  Please allow 3 business days for your refill to be completed.    As always, thank you for trusting us with your health care needs!               Follow-ups after your visit        Your next 10 appointments already scheduled     May 29, 2018  7:00 AM CDT   MR MYOCARDIUM W CONTRAST with UUMR4, UU CV MR NURSE   [facility], [place], MRI ([facility])    [address] [id number] [phone]           Take your medicines as usual, unless your doctor tells you not to. Bring a list of your current medicines to your exam (including vitamins, minerals and over-the-counter drugs).  You may or may not receive intravenous (IV) contrast for this exam pending the discretion of the Radiologist.  You do not need to do anything special to prepare.  The MRI machine uses a strong magnet. Please wear clothes without metal (snaps, zippers). A sweatsuit works well, or we may give you a hospital gown.  Please remove any body piercings and hair extensions before you arrive. You will also remove watches, jewelry, hairpins, wallets, dentures, partial dental plates and hearing aids. You may wear contact lenses, and you may be able to wear your rings. We have a safe place to keep your personal items, but it is safer to leave them at home.  **IMPORTANT** THE INSTRUCTIONS BELOW ARE ONLY FOR THOSE PATIENTS WHO HAVE BEEN PRESCRIBED SEDATION OR GENERAL ANESTHESIA DURING THEIR MRI PROCEDURE:  IF YOUR DOCTOR PRESCRIBED ORAL SEDATION (take medicine to help you relax during your exam):   You must get the medicine from your doctor (oral medication) before you arrive. Bring the medicine to the exam. Do not take it at home. You ll be told when to take it upon arriving for your exam.   Arrive one hour early. Bring someone who can take you home after the test. Your medicine will make you sleepy. After the exam, you may not drive, take a bus or take a taxi by yourself.  IF YOUR DOCTOR PRESCRIBED IV SEDATION:   Arrive one hour early. Bring someone who can take you home after the test. Your medicine will make you sleepy. After the  exam, you may not drive, take a bus or take a taxi by yourself.   No eating 6 hours before your exam. You may have clear liquids up until 4 hours before your exam. (Clear liquids include water, clear tea, black coffee and fruit juice without pulp.)  IF YOUR DOCTOR PRESCRIBED ANESTHESIA (be asleep for your exam):   Arrive 1 1/2 hours early. Bring someone who can take you home after the test. You may not drive, take a bus or take a taxi by yourself.   No eating 8 hours before your exam. You may have clear liquids up until 4 hours before your exam. (Clear liquids include water, clear tea, black coffee and fruit juice without pulp.)   You will spend four to five hours in the recovery room.  Please call the Imaging Department at your exam site with any questions.              Future tests that were ordered for you today     Open Future Orders        Priority Expected Expires Ordered    MRI Cardiac w/contrast Routine 5/5/2018 5/4/2019 5/4/2018            Who to contact     If you have questions or need follow up information about today's clinic visit or your schedule please contact [facility] PHYSICIANS HEART AT [facility] directly at [phone].  Normal or non-critical lab and imaging results will be communicated to you by Cloupiahart, letter or phone within 4 business days after the clinic has received the results. If you do not hear from us within 7 days, please contact the clinic through Cloupiahart or phone. If you have a critical or abnormal lab result, we will notify you by phone as soon as possible.  Submit refill requests through [employer] or call your pharmacy and they will forward the refill request to us. Please allow 3 business days for your refill to be completed.          Additional Information About Your Visit        [employer] Information     [employer] lets you send messages to your doctor, view your test results, renew your prescriptions, schedule appointments and more. To sign up, go to  [email or web address] . Click on \"Log in\" on the left side of the screen, which will take you to the Welcome page. Then click on \"Sign up Now\" on the right side of the page.     You will be asked to enter the access code listed below, as well as some personal information. Please follow the directions to create your username and password.     Your access code is: [email or web address]  Expires: 2018  3:43 PM     Your access code will  in 90 days. If you need help or a new code, please call your [facility] clinic or [phone].        Care EveryWhere ID     This is your Care EveryWhere ID. This could be used by other organizations to access your [facility] medical records  [id number]        Your Vitals Were     Pulse Height Pulse Oximetry BMI (Body Mass Index)          80 1.765 m (5' 9.5\") 98% 26.2 kg/m2         Blood Pressure from Last 3 Encounters:   18 123/81   17 129/86   17 131/83    Weight from Last 3 Encounters:   18 81.6 kg (180 lb)   17 77.1 kg (170 lb)   17 77.1 kg (170 lb)               Primary Care Provider Fax #    Physician No Ref-Primary [phone]       No address on file        Equal Access to Services     GALI MANNING : Hadbill gamez Soadi, waaxda luqadaha, qaybta kaalmada judit, rita olmedo. So [facility] [phone].    ATENCIÓN: Si habla español, tiene a deluca disposición servicios gratuitos de asistencia lingüística. Robb al [phone].    We comply with applicable federal civil rights laws and [place] laws. We do not discriminate on the basis of race, color, national origin, age, disability, sex, sexual orientation, or gender identity.            Thank you!     Thank you for choosing [facility] PHYSICIANS HEART AT [facility]  for your care. Our goal is always to provide you with excellent care. Hearing back from our patients is one way we can continue to improve our services. Please take a few " minutes to complete the written survey that you may receive in the mail after your visit with us. Thank you!             Your Updated Medication List - Protect others around you: Learn how to safely use, store and throw away your medicines at www.disposemymeds.org.          This list is accurate as of 5/4/18  3:43 PM.  Always use your most recent med list.                   Brand Name Dispense Instructions for use Diagnosis    chlorhexidine 0.12 % solution    PERIDEX    473 mL    Swish 10 mL in mouth for 30 seconds and spit two times daily    Bilateral fracture of mandible, open, initial encounter (H)       colchicine 0.6 MG tablet      Take 0.6 mg by mouth        nicotine 7 MG/24HR 24 hr patch    NICODERM CQ    30 patch    Place 1 patch onto the skin every 24 hours    Bilateral fracture of mandible, open, initial encounter (H)

## 2018-05-04 NOTE — LETTER
5/4/2018      RE: Freddy Lange  [address]  [place] [address]       Dear Colleague,    Thank you for the opportunity to participate in the care of your patient, Freddy Lange, at the [facility] HEART AT [facility]. Please see a copy of my visit note below.    May 4, 2018     I had the pleasure of seeing Freddy Lange  in the [facility] for further evaluation and management of his pericarditis. While he has no known relevant past medical history he presented to an outside emergency room early May with complaints of 4 day history of intermittent sharp chest pain.  The pain was described as sharp lasting a few seconds to 102 minutes and usually resolves on its own.  It was positional seem to get worse when he was laying flat.  There were no prodrome or any vital symptoms before this event.  It does not appear that the pain was reproducible although he shoveled quite a bit of snow the days prior.  EKG was obtained and this showed diffuse ST segment elevation and repeat troponin was negative.  He was started on colchicine 0.6 mg daily in addition to ibuprofen 600 mg every 6 hours and was discharged home.  Today he presents to clinic for follow-up.  Notes that since his discharge from the emergency room he has been feeling better, he did not have any further episodes of shortness of breath.  However he continued to have with decreasing frequency and intensity sharp episodes of chest pain that lasted a few seconds to half a minute.  They are located usually around the stent on the left side of the chest.  They seem to be worse when he is laying down.  Again these episodes have improved significantly since his initial presentation to the outside emergency room.  Notes that he has not been taking his colchicine as the cost is prohibitive.  He has been taking ibuprofen at times however he does not  feel it helps so he does not take it very frequently.  He denies any other complaints no lower extremity edema PND or orthopnea.  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  As noted above he denies any fevers chills upper respiratory symptoms prior to his initial presentation.  His girlfriend might have had some viral infection however does not think that he got it.  He denies any current smoking he has occasional alcohol mainly.  Over the weekends and denies any illicit drug use.  Note the family history is significant for twins in the past who turned out to have coarctation of the aorta with bicuspid aortic valve.  These were diagnosed late at the age of 14 per mom.  No familial screening has been recommended or performed.     PAST MEDICAL HISTORY:  - No relevant past cardiac history other than an episode of palpitations when he did have a stress test that was performed for PVCs and was reportedly negative.    FAMILY HISTORY:  Family History   Problem Relation Age of Onset     DIABETES Father      DIABETES Mother      Unknown/Adopted Maternal Grandfather      Cancer - colorectal Paternal Grandmother      Unknown/Adopted Paternal Grandfather      Cardiovascular Brother      SOCIAL HISTORY:  Social History     Social History     Marital status: Single     Spouse name: N/A     Number of children: N/A     Years of education: N/A     Social History Main Topics     Smoking status: Passive Smoke Exposure - Never Smoker     Smokeless tobacco: Never Used      Comment: mom smoke in the house     Alcohol use No     Drug use: No     Sexual activity: No     Other Topics Concern     Not on file     Social History Narrative     CURRENT MEDICATIONS:  Current Outpatient Prescriptions   Medication     chlorhexidine (PERIDEX) 0.12 % solution     nicotine (NICODERM CQ) 7 MG/24HR 24 hr patch     No current facility-administered medications for this visit.      ROS:   Constitutional: No fever, chills, or  "sweats. Weight is 0 lbs 0 oz  ENT: No visual disturbance, ear ache, epistaxis, sore throat.   Allergies/Immunologic: Negative.   Respiratory: No cough, hemoptysis.   Cardiovascular: As per HPI.   GI: No nausea, vomiting, hematemesis, melena, or hematochezia.   : No urinary frequency, dysuria, or hematuria.   Integrument: Negative.   Psychiatric: No evidence of major depression  Neuro: No new neurological complaints at this time. Non focal  Endocrinology: Negative.   Musculoskeletal: As per HPI.      EXAM:  /81 (BP Location: Left arm, Patient Position: Chair, Cuff Size: Adult Regular)  Pulse 80  Ht 1.765 m (5' 9.5\")  Wt 81.6 kg (180 lb)  SpO2 98%  BMI 26.2 kg/m2  General: appears comfortable, alert and oriented. Mom present at bedside for the evaluation.  Head: normocephalic, atraumatic  Eyes: anicteric sclera, EOMI , PERRL  Neck: no adenopathy  Orophyarynx: moist mucosa, no lesions noted  Heart: regular, S1/S2, no murmurs, rubs or gallop. Estimated JVP at 5 cmH2O. No pericardial rub  Lungs: CTAB, No wheezing.   Abdomen: soft, non-tender, bowel sounds present, no hepatosplenomegaly  Extremities: No LE edema today  Skin: no open lesions noted  Neuro: grossly non-focal  Psych: no evidence of depression noted     Labs:  Lab Results   Component Value Date    WBC 10.9 08/12/2017    HGB 16.0 08/12/2017    HCT 48.1 08/12/2017     08/12/2017     08/12/2017    POTASSIUM 4.1 08/12/2017    CHLORIDE 110 (H) 08/12/2017    CO2 25 08/12/2017    BUN 10 08/12/2017    CR 1.05 08/12/2017     (H) 08/12/2017    TROPI <0.012 03/03/2009    AST 32 03/03/2009    ALT 13 03/03/2009    ALKPHOS 356 03/03/2009    BILITOTAL 0.3 03/03/2009      ASSESSMENT AND PLAN:  In summary, patient is a 23 year old male with above past medical history including a negative stress test reportedly back in 2009 that was performed for PVCs and family history of coarctation of the aorta with bicuspid aortic valve with siblings here " for pericarditis follow-up that was diagnosed in outside emergency room.  Unfortunately ECG from the time of the visit was not available for review however per chart review diffuse ST elevations were noted with negative troponin he was discharged on colchicine and ibuprofen however he has not been taking these mostly due to financial constraints.  Since the visit he notes that his complaints are better and frequency and intensity of his chest pains are much better.  We will obtain an EKG here in the office and then we will refer him for a cardiac MRI to further evaluate his pericardium as well as to see if he has any bicuspid valve or coarctation of the aorta.  At this time he is not willing to continue with the colchicine.  Given his infrequent episodes of palpitations we also discussed that we will place a ZioPatch monitor to evaluate for any underlying arrhythmias possible high PVC burden.  We will review these and get back to him as soon as possible.  In the meantime we will not start any medications.  I will see him back as soon as the results are back.  In addition given the history of bicuspid valve and coarctation of the aorta I discussed with the patient's mom that family screening is strongly recommended to ensure no other family members are affected with the disease.  She verbalized understanding.    Follow up:   After MRI    I appreciate the opportunity to participate in the care of Freddy Lange . Please do not hesitate to contact me with any further questions.    Sincerely,   Dennis Goss MD     [facility] Division of Cardiology

## 2018-05-04 NOTE — LETTER
May 4, 2018          To Whom It May Concern:      Freddy was seen in our cardiology clinic today. Please do not hesitate to call me if you have any questions or concerns.      Sincerely,        Dennis Goss MD

## 2018-05-04 NOTE — LETTER
May 4, 2018      Freddy was recently seen in our cardiology clinic for an office visit. Please excuse him from work due to this visit today.    Please do not hesitate to call me if you have any questions or concerns.        Sincerely,          Dennis Goss MD

## 2018-05-25 DIAGNOSIS — Z82.79 FAMILY HISTORY OF CONGENITAL ANOMALY: Primary | ICD-10-CM

## 2018-05-29 ENCOUNTER — TELEPHONE (OUTPATIENT)
Dept: CARDIOLOGY | Facility: CLINIC | Age: 23
End: 2018-05-29

## 2018-05-29 NOTE — TELEPHONE ENCOUNTER
Reason for Call:  Other call back    Detailed comments: Patient is sick and unable to make the appointment on 05/29/2018; Mother would like to speak with Cardio dept.    Phone Number Patient can be reached at: Home number on file [phone] (home)    Best Time: ASAP    Can we leave a detailed message on this number? YES    Call taken on 5/29/2018 at 10:13 AM by Prachi Duran

## 2018-05-29 NOTE — LETTER
June 5, 2018      TO: Freddy BLUE [place] [id number]  [place] [address]         Dear Freddy,    We have not been able to reach you to reschedule your MRI that you could not make it to.     Please contact us at [phone] to reschedule this at your convenience.      Sincerely,      Jessica Fuentes, RN  Clinic Nurse/Care Coordinator  [facility]  [phone]

## 2018-05-29 NOTE — TELEPHONE ENCOUNTER
Called to reschedule, got voicemail. Unable to leave message. Will try again later. Padmini Walsh MA

## 2018-06-05 NOTE — TELEPHONE ENCOUNTER
Letter sent for pt to call clinic back to reschedule MRI.    Attempted to call pt 3 times. Closing encounter.    Jessica Fuentes RN

## (undated) DEVICE — SU CHROMIC 3-0 FS-2 27" 636

## (undated) DEVICE — NDL 25GA 2"  8881200441

## (undated) DEVICE — BRUSH SURGICAL EZ SCRUB PLAIN 371603

## (undated) DEVICE — Device

## (undated) DEVICE — DRSG TELFA 3X8" 1238

## (undated) DEVICE — TAPE DURAPORE 2"X1.5YD SILK 1538S-2

## (undated) DEVICE — DRAPE MAYO STAND 23X54 8337

## (undated) DEVICE — PACK NEURO MINOR UMMC SNE32MNMU4

## (undated) DEVICE — DRILL BIT SYN 1.5X125MM W/08MM STOP J-LATCH

## (undated) DEVICE — SPONGE KITTNER 30-101

## (undated) DEVICE — SOL WATER IRRIG 1000ML BOTTLE 2F7114

## (undated) DEVICE — PREP SKIN SCRUB TRAY 4461A

## (undated) DEVICE — PREP POVIDONE IODINE SCRUB 7.5% 120ML

## (undated) DEVICE — SPONGE BALL KERLIX ROUND XL W/O STRING LATEX 4935

## (undated) DEVICE — DRILL BIT SYN 1.5X44.5 W/6MM STOP J LATCH 317.66

## (undated) DEVICE — PAD CHUX UNDERPAD 23X24" 7136

## (undated) DEVICE — DRILL BIT SYN 1.5X110MM J LATCH 317.835

## (undated) DEVICE — SU VICRYL 4-0 P-3 18" UND  J494H

## (undated) DEVICE — ESU ELEC NDL 1" COATED/INSULATED E1465

## (undated) DEVICE — LINEN TOWEL PACK X6 WHITE 5487

## (undated) DEVICE — LINEN TOWEL PACK X5 5464

## (undated) DEVICE — DRSG STERI STRIP 1/4X4" R1546

## (undated) DEVICE — SYR EAR BULB 3OZ 0035830

## (undated) DEVICE — SU ETHIBOND 0 CT-2 30" X412H

## (undated) DEVICE — DRSG JAWBRA  95

## (undated) DEVICE — ADH LIQUID MASTISOL TOPICAL VIAL 2-3ML 0523-48

## (undated) DEVICE — ANTIFOG SOLUTION W/FOAM PAD 31142527

## (undated) DEVICE — SU VICRYL 3-0 X-1 27" UND J458H

## (undated) DEVICE — SYR 10ML FINGER CONTROL W/O NDL 309695

## (undated) DEVICE — GLOVE PROTEXIS POWDER FREE 8.5 ORTHOPEDIC 2D73ET85

## (undated) DEVICE — SOL NACL 0.9% IRRIG 1000ML BOTTLE 2F7124

## (undated) DEVICE — GLOVE PROTEXIS POWDER FREE SMT 7.5  2D72PT75X

## (undated) DEVICE — ESU GROUND PAD ADULT W/CORD E7507

## (undated) DEVICE — PREP POVIDONE IODINE SOLUTION 10% 120ML

## (undated) DEVICE — BLADE CLIPPER SGL USE 9680

## (undated) DEVICE — TOOTHBRUSH ADULT NON STERILE MDS136850

## (undated) RX ORDER — PHENYLEPHRINE HCL IN 0.9% NACL 1 MG/10 ML
SYRINGE (ML) INTRAVENOUS
Status: DISPENSED
Start: 2017-08-12

## (undated) RX ORDER — FENTANYL CITRATE 50 UG/ML
INJECTION, SOLUTION INTRAMUSCULAR; INTRAVENOUS
Status: DISPENSED
Start: 2017-08-12

## (undated) RX ORDER — PROPOFOL 10 MG/ML
INJECTION, EMULSION INTRAVENOUS
Status: DISPENSED
Start: 2017-08-12

## (undated) RX ORDER — ROCURONIUM BROMIDE 50 MG/5 ML
SYRINGE (ML) INTRAVENOUS
Status: DISPENSED
Start: 2017-08-12

## (undated) RX ORDER — LIDOCAINE HYDROCHLORIDE 20 MG/ML
INJECTION, SOLUTION EPIDURAL; INFILTRATION; INTRACAUDAL; PERINEURAL
Status: DISPENSED
Start: 2017-08-12

## (undated) RX ORDER — HYDROCODONE BITARTRATE AND ACETAMINOPHEN 5; 325 MG/1; MG/1
TABLET ORAL
Status: DISPENSED
Start: 2017-08-12

## (undated) RX ORDER — KETOROLAC TROMETHAMINE 30 MG/ML
INJECTION, SOLUTION INTRAMUSCULAR; INTRAVENOUS
Status: DISPENSED
Start: 2017-08-12